# Patient Record
Sex: MALE | Race: WHITE | Employment: FULL TIME | URBAN - METROPOLITAN AREA
[De-identification: names, ages, dates, MRNs, and addresses within clinical notes are randomized per-mention and may not be internally consistent; named-entity substitution may affect disease eponyms.]

---

## 2018-09-10 ENCOUNTER — PAT TELEPHONE (OUTPATIENT)
Dept: PREADMISSION TESTING | Age: 53
End: 2018-09-10

## 2018-09-10 VITALS — BODY MASS INDEX: 26.11 KG/M2 | HEIGHT: 75 IN | WEIGHT: 210 LBS

## 2018-09-10 RX ORDER — OMEPRAZOLE 20 MG/1
20 CAPSULE, DELAYED RELEASE ORAL DAILY
COMMUNITY
End: 2021-12-15 | Stop reason: SDUPTHER

## 2018-09-10 RX ORDER — CETIRIZINE HYDROCHLORIDE 10 MG/1
10 TABLET ORAL DAILY
COMMUNITY

## 2018-09-10 NOTE — PROGRESS NOTES
them.     If you have a living will and a durable power of  for healthcare, please bring in a copy. As part of our patient safety program to minimize surgical site infections, we ask you to do the following:    · Please notify your surgeon if you develop any illness between         now and the  day of your surgery. · This includes a cough, cold, fever, sore throat, nausea,         or vomiting, and diarrhea, etc.  ·  Please notify your surgeon if you experience dizziness, shortness         of breath or blurred vision between now and the time of your surgery. You may shower the night before surgery or the morning of   your surgery with an antibacterial soap. You will need to bring a photo ID and insurance card    [facility] has an onsite pharmacy, would you like to utilize our pharmacy     If you will be staying overnight and use a C-pap machine, please bring   your C-pap to hospital     Our goal is to provide you with excellent care, therefore, visitors will be limited to two(2) in the room at a time so that we may focus on providing this care for you. Please contact pre-admission testing if you have any further questions. [facility] phone number:  [phone]  Please note these are generalized instructions for all surgical cases, you may be provided with more specific instructions according to your surgery.

## 2018-09-17 ENCOUNTER — HOSPITAL ENCOUNTER (OUTPATIENT)
Dept: ENDOSCOPY | Age: 53
Discharge: HOME | End: 2018-09-17
Attending: INTERNAL MEDICINE | Admitting: INTERNAL MEDICINE

## 2018-09-17 VITALS
BODY MASS INDEX: 25.74 KG/M2 | HEART RATE: 44 BPM | DIASTOLIC BLOOD PRESSURE: 77 MMHG | RESPIRATION RATE: 16 BRPM | OXYGEN SATURATION: 100 % | SYSTOLIC BLOOD PRESSURE: 117 MMHG | TEMPERATURE: 97 F | HEIGHT: 75 IN | WEIGHT: 207 LBS

## 2018-09-17 DIAGNOSIS — Z12.11 ENCOUNTER FOR SCREENING FOR MALIGNANT NEOPLASM OF COLON: ICD-10-CM

## 2018-09-17 RX ORDER — SODIUM CHLORIDE 9 MG/ML
INJECTION, SOLUTION INTRAVENOUS CONTINUOUS
Status: DISCONTINUED | OUTPATIENT
Start: 2018-09-17 | End: 2018-09-18 | Stop reason: HOSPADM

## 2018-09-17 RX ORDER — SODIUM CHLORIDE 0.9 % (FLUSH) 0.9 %
10 SYRINGE (ML) INJECTION EVERY 12 HOURS SCHEDULED
Status: DISCONTINUED | OUTPATIENT
Start: 2018-09-17 | End: 2018-09-18 | Stop reason: HOSPADM

## 2018-09-17 RX ORDER — SODIUM CHLORIDE 0.9 % (FLUSH) 0.9 %
10 SYRINGE (ML) INJECTION PRN
Status: DISCONTINUED | OUTPATIENT
Start: 2018-09-17 | End: 2018-09-18 | Stop reason: HOSPADM

## 2018-09-17 RX ORDER — ONDANSETRON 2 MG/ML
4 INJECTION INTRAMUSCULAR; INTRAVENOUS
Status: ACTIVE | OUTPATIENT
Start: 2018-09-17 | End: 2018-09-17

## 2018-09-17 RX ORDER — ONDANSETRON 2 MG/ML
4 INJECTION INTRAMUSCULAR; INTRAVENOUS
Status: COMPLETED | OUTPATIENT
Start: 2018-09-17 | End: 2018-09-17

## 2018-09-17 RX ORDER — LABETALOL HYDROCHLORIDE 5 MG/ML
5 INJECTION, SOLUTION INTRAVENOUS EVERY 10 MIN PRN
Status: DISCONTINUED | OUTPATIENT
Start: 2018-09-17 | End: 2018-09-18 | Stop reason: HOSPADM

## 2018-09-17 RX ORDER — PROMETHAZINE HYDROCHLORIDE 25 MG/ML
6.25 INJECTION, SOLUTION INTRAMUSCULAR; INTRAVENOUS
Status: ACTIVE | OUTPATIENT
Start: 2018-09-17 | End: 2018-09-17

## 2018-09-17 RX ADMIN — ONDANSETRON 4 MG: 2 INJECTION INTRAMUSCULAR; INTRAVENOUS at 11:08

## 2018-09-17 RX ADMIN — SODIUM CHLORIDE: 9 INJECTION, SOLUTION INTRAVENOUS at 10:52

## 2018-09-17 ASSESSMENT — PAIN SCALES - GENERAL
PAINLEVEL_OUTOF10: 0

## 2018-09-17 ASSESSMENT — PAIN - FUNCTIONAL ASSESSMENT: PAIN_FUNCTIONAL_ASSESSMENT: 0-10

## 2018-09-17 NOTE — OP NOTE
COLONOSCOPY      Patient: Lorena Griffith  : 1965   Acct#: [de-identified]    Procedure: Colonoscopy. Date: 2018    Endoscopist: Dahlia Calderón MD    Referring Physician:  Dr. Chaz Valadez    Indications: This is a 46y.o. year old male who presents today for screening colonoscopy. There are no genetic risk factors,    Anesthesia: Propofol 340 mg IV. Procedure: An informed consent was obtained from the patient after explanation of indications, benefits, possible risks and complications of the procedure. The patient was placed in the left lateral decubitus position, and the above IV anesthesia was administered. Digital rectal examination revealed no significant hemorrhoids. There were no sentinel tags. The prostate gland was normal.      The Olympus CFQ-190-AL video colonoscope was inserted and advanced without significant difficulty to the cecum. The cecum was identified by characteristic anatomy of the appendix and the ileo-cecal valve. The preparation was excellent. Circumferential examination of the mucosa demonstrated normal mucosa. Retroflexion in the rectum demonstrated no abnormalities. The patient tolerated the procedure well and was taken to the [address] [place] [address] Unit in good condition. Impression: Normal screening colonoscopy. No genetic risk factors. Recommendations: Repeat colonoscopy screening in ten years.     Previous Colonoscopy: No    Electronically signed by Dahlia Calderón MD on 2018 at 11:50 AM.

## 2018-09-17 NOTE — H&P
Patient: Catia Lockwood  : 1965   Acct#: [de-identified]    Procedure: EGD / Colonoscopy    Date: 2018    Endoscopist: Zayra Phipps MD    Pre-Sedation Documentation and Exam:   I have personally completed a history, physical exam & review of systems for this patient (see notes).     Mallampati Airway Assessment:  Mallampati Class II - (soft palate, fauces & uvula are visible)    ASA Classification:  Class 2 - A normal healthy patient with mild systemic disease    Sedation/ Anesthesia Plan:   general    Patient is an appropriate candidate for plan of sedation: yes    Electronically signed by Zayra Phipps MD on 2018 at 11:14 AM

## 2018-09-17 NOTE — ANESTHESIA PRE-OP
03/15/2016    RBC 4.25 03/15/2016    HGB 13.2 03/15/2016    HCT 39.6 03/15/2016    MCV 93.1 03/15/2016    RDW 14.2 03/15/2016     03/15/2016     CMP    Lab Results   Component Value Date     03/15/2016    K 4.1 03/15/2016     03/15/2016    CO2 28 03/15/2016    BUN 18 03/15/2016    CREATININE 1.1 03/15/2016    GFRAA >60 03/15/2016    GFRAA >60 06/05/2013    AGRATIO 1.4 03/15/2016    LABGLOM >60 03/15/2016    GLUCOSE 99 03/15/2016    PROT 7.4 03/15/2016    CALCIUM 9.7 03/15/2016    BILITOT <0.2 03/15/2016    ALKPHOS 37 03/15/2016    AST 32 03/15/2016    ALT 20 03/15/2016     BMP    Lab Results   Component Value Date     03/15/2016    K 4.1 03/15/2016     03/15/2016    CO2 28 03/15/2016    BUN 18 03/15/2016    CREATININE 1.1 03/15/2016    CALCIUM 9.7 03/15/2016    GFRAA >60 03/15/2016    GFRAA >60 06/05/2013    LABGLOM >60 03/15/2016    GLUCOSE 99 03/15/2016     POCGlucose  No results for input(s): GLUCOSE in the last 72 hours.    Coags  No results found for: PROTIME, INR, APTT  HCG (If Applicable) No results found for: PREGTESTUR, PREGSERUM, HCG, HCGQUANT   ABGs No results found for: PHART, PO2ART, ONM0OEQ, SDP5RSA, BEART, E1GIWDYD   Type & Screen (If Applicable)  No results found for: LABABO, LABRH                         BMI: Wt Readings from Last 3 Encounters:       NPO Status:8 hours                          Anesthesia Evaluation  Patient summary reviewed no history of anesthetic complications:   Airway: Mallampati: III  TM distance: >3 FB   Neck ROM: full  Mouth opening: < 3 FB Dental: normal exam         Pulmonary:Negative Pulmonary ROS and normal exam                               Cardiovascular:Negative CV ROS  Exercise tolerance: good (>4 METS),           Rhythm: regular  Rate: normal           Beta Blocker:  Not on Beta Blocker         Neuro/Psych:   Negative Neuro/Psych ROS              GI/Hepatic/Renal:   (+) GERD:, bowel prep,           Endo/Other: Negative Endo/Other ROS Abdominal:           Vascular: negative vascular ROS. Anesthesia Plan      MAC     ASA 2       Induction: intravenous. Anesthetic plan and risks discussed with patient. Plan discussed with CRNA. This pre-anesthesia assessment may be used as a history and physical.    DOS STAFF ADDENDUM:    Pt seen and examined, chart reviewed (including anesthesia, drug and allergy history). No interval changes to history and physical examination. Anesthetic plan, risks, benefits, alternatives, and personnel involved discussed with patient. Patient verbalized an understanding and agrees to proceed.       Nancy Casillas MD  September 17, 2018  10:43 AM

## 2020-06-03 ENCOUNTER — TELEPHONE (OUTPATIENT)
Dept: PRIMARY CARE CLINIC | Age: 55
End: 2020-06-03

## 2020-06-03 ENCOUNTER — OFFICE VISIT (OUTPATIENT)
Dept: PRIMARY CARE CLINIC | Age: 55
End: 2020-06-03
Payer: COMMERCIAL

## 2020-06-03 VITALS — OXYGEN SATURATION: 96 % | TEMPERATURE: 101.6 F | HEART RATE: 74 BPM

## 2020-06-03 PROCEDURE — 99213 OFFICE O/P EST LOW 20 MIN: CPT | Performed by: NURSE PRACTITIONER

## 2020-06-03 RX ORDER — AZITHROMYCIN 250 MG/1
250 TABLET, FILM COATED ORAL SEE ADMIN INSTRUCTIONS
Qty: 6 TABLET | Refills: 0 | Status: SHIPPED | OUTPATIENT
Start: 2020-06-03 | End: 2020-06-08

## 2020-06-05 LAB
SARS-COV-2: DETECTED
SOURCE: ABNORMAL

## 2021-11-24 ENCOUNTER — OFFICE VISIT (OUTPATIENT)
Dept: FAMILY MEDICINE CLINIC | Age: 56
End: 2021-11-24
Payer: COMMERCIAL

## 2021-11-24 VITALS
HEIGHT: 75 IN | BODY MASS INDEX: 26.86 KG/M2 | DIASTOLIC BLOOD PRESSURE: 68 MMHG | SYSTOLIC BLOOD PRESSURE: 122 MMHG | WEIGHT: 216 LBS | HEART RATE: 58 BPM

## 2021-11-24 DIAGNOSIS — R35.0 BENIGN PROSTATIC HYPERPLASIA WITH URINARY FREQUENCY: ICD-10-CM

## 2021-11-24 DIAGNOSIS — M48.061 SPINAL STENOSIS OF LUMBAR REGION, UNSPECIFIED WHETHER NEUROGENIC CLAUDICATION PRESENT: ICD-10-CM

## 2021-11-24 DIAGNOSIS — E78.00 HYPERCHOLESTEREMIA: ICD-10-CM

## 2021-11-24 DIAGNOSIS — D64.9 MILD ANEMIA: ICD-10-CM

## 2021-11-24 DIAGNOSIS — R59.9 SWOLLEN GLAND: ICD-10-CM

## 2021-11-24 DIAGNOSIS — Z00.00 WELL ADULT EXAM: Primary | ICD-10-CM

## 2021-11-24 DIAGNOSIS — K21.9 GASTROESOPHAGEAL REFLUX DISEASE, UNSPECIFIED WHETHER ESOPHAGITIS PRESENT: ICD-10-CM

## 2021-11-24 DIAGNOSIS — L50.8 CHRONIC URTICARIA: ICD-10-CM

## 2021-11-24 DIAGNOSIS — K22.2 ESOPHAGEAL STRICTURE: ICD-10-CM

## 2021-11-24 DIAGNOSIS — N40.1 BENIGN PROSTATIC HYPERPLASIA WITH URINARY FREQUENCY: ICD-10-CM

## 2021-11-24 LAB
A/G RATIO: 1.7 (ref 1.1–2.2)
ALBUMIN SERPL-MCNC: 4.5 G/DL (ref 3.4–5)
ALP BLD-CCNC: 39 U/L (ref 40–129)
ALT SERPL-CCNC: 33 U/L (ref 10–40)
ANION GAP SERPL CALCULATED.3IONS-SCNC: 10 MMOL/L (ref 3–16)
AST SERPL-CCNC: 50 U/L (ref 15–37)
BASOPHILS ABSOLUTE: 0 K/UL (ref 0–0.2)
BASOPHILS RELATIVE PERCENT: 0.6 %
BILIRUB SERPL-MCNC: 0.3 MG/DL (ref 0–1)
BUN BLDV-MCNC: 25 MG/DL (ref 7–20)
CALCIUM SERPL-MCNC: 9.4 MG/DL (ref 8.3–10.6)
CHLORIDE BLD-SCNC: 104 MMOL/L (ref 99–110)
CHOLESTEROL, TOTAL: 223 MG/DL (ref 0–199)
CO2: 26 MMOL/L (ref 21–32)
CREAT SERPL-MCNC: 1 MG/DL (ref 0.9–1.3)
EOSINOPHILS ABSOLUTE: 0.2 K/UL (ref 0–0.6)
EOSINOPHILS RELATIVE PERCENT: 2.5 %
GFR AFRICAN AMERICAN: >60
GFR NON-AFRICAN AMERICAN: >60
GLUCOSE BLD-MCNC: 83 MG/DL (ref 70–99)
HCT VFR BLD CALC: 38.9 % (ref 40.5–52.5)
HDLC SERPL-MCNC: 46 MG/DL (ref 40–60)
HEMOGLOBIN: 12.8 G/DL (ref 13.5–17.5)
LDL CHOLESTEROL CALCULATED: 143 MG/DL
LYMPHOCYTES ABSOLUTE: 1.7 K/UL (ref 1–5.1)
LYMPHOCYTES RELATIVE PERCENT: 25.8 %
MCH RBC QN AUTO: 30.9 PG (ref 26–34)
MCHC RBC AUTO-ENTMCNC: 32.8 G/DL (ref 31–36)
MCV RBC AUTO: 94 FL (ref 80–100)
MONOCYTES ABSOLUTE: 0.7 K/UL (ref 0–1.3)
MONOCYTES RELATIVE PERCENT: 10.4 %
NEUTROPHILS ABSOLUTE: 4 K/UL (ref 1.7–7.7)
NEUTROPHILS RELATIVE PERCENT: 60.7 %
PDW BLD-RTO: 13.5 % (ref 12.4–15.4)
PLATELET # BLD: 188 K/UL (ref 135–450)
PMV BLD AUTO: 10.1 FL (ref 5–10.5)
POTASSIUM SERPL-SCNC: 4.2 MMOL/L (ref 3.5–5.1)
PROSTATE SPECIFIC ANTIGEN: 0.55 NG/ML (ref 0–4)
RBC # BLD: 4.14 M/UL (ref 4.2–5.9)
SODIUM BLD-SCNC: 140 MMOL/L (ref 136–145)
TOTAL PROTEIN: 7.2 G/DL (ref 6.4–8.2)
TRIGL SERPL-MCNC: 172 MG/DL (ref 0–150)
VLDLC SERPL CALC-MCNC: 34 MG/DL
WBC # BLD: 6.6 K/UL (ref 4–11)

## 2021-11-24 PROCEDURE — 99386 PREV VISIT NEW AGE 40-64: CPT | Performed by: FAMILY MEDICINE

## 2021-11-24 PROCEDURE — G8484 FLU IMMUNIZE NO ADMIN: HCPCS | Performed by: FAMILY MEDICINE

## 2021-11-24 PROCEDURE — 36415 COLL VENOUS BLD VENIPUNCTURE: CPT | Performed by: FAMILY MEDICINE

## 2021-11-24 SDOH — ECONOMIC STABILITY: FOOD INSECURITY: WITHIN THE PAST 12 MONTHS, YOU WORRIED THAT YOUR FOOD WOULD RUN OUT BEFORE YOU GOT MONEY TO BUY MORE.: NEVER TRUE

## 2021-11-24 SDOH — ECONOMIC STABILITY: FOOD INSECURITY: WITHIN THE PAST 12 MONTHS, THE FOOD YOU BOUGHT JUST DIDN'T LAST AND YOU DIDN'T HAVE MONEY TO GET MORE.: NEVER TRUE

## 2021-11-24 SDOH — ECONOMIC STABILITY: TRANSPORTATION INSECURITY
IN THE PAST 12 MONTHS, HAS THE LACK OF TRANSPORTATION KEPT YOU FROM MEDICAL APPOINTMENTS OR FROM GETTING MEDICATIONS?: NO

## 2021-11-24 SDOH — ECONOMIC STABILITY: TRANSPORTATION INSECURITY
IN THE PAST 12 MONTHS, HAS LACK OF TRANSPORTATION KEPT YOU FROM MEETINGS, WORK, OR FROM GETTING THINGS NEEDED FOR DAILY LIVING?: NO

## 2021-11-24 ASSESSMENT — PATIENT HEALTH QUESTIONNAIRE - PHQ9
SUM OF ALL RESPONSES TO PHQ QUESTIONS 1-9: 0
1. LITTLE INTEREST OR PLEASURE IN DOING THINGS: 0
2. FEELING DOWN, DEPRESSED OR HOPELESS: 0
SUM OF ALL RESPONSES TO PHQ QUESTIONS 1-9: 0
SUM OF ALL RESPONSES TO PHQ QUESTIONS 1-9: 0
SUM OF ALL RESPONSES TO PHQ9 QUESTIONS 1 & 2: 0

## 2021-11-24 ASSESSMENT — SOCIAL DETERMINANTS OF HEALTH (SDOH): HOW HARD IS IT FOR YOU TO PAY FOR THE VERY BASICS LIKE FOOD, HOUSING, MEDICAL CARE, AND HEATING?: NOT HARD AT ALL

## 2021-11-24 NOTE — PROGRESS NOTES
2021    Juli Mohamud (:  1965) is a 61 y.o. male, here for a preventive medicine evaluation. Chief Complaint   Patient presents with    Annual Exam     ANNUAL ROUTINE PHYSICAL COLONOSCOPY DONE 2 YEARS AGO      Seen last 5 years ago for chronic urticaria  Patient Active Problem List   Diagnosis    Esophageal stricture    GERD (gastroesophageal reflux disease)    Lumbar spinal stenosis    Palpitations     Had covid infection   psa 0.4, hgb 13.2, o/w labs good - lipid only   Reviewed past history  SLOWER URINE STREAM  USUALLY 1-2X/ NIGHT TO URINATE. Not big water drinker but reasonably. Able to sleep fairly well - not great - doing okay overall. More aches and pains in general.  Dad w/ prostate cancer - age 70  Brother w/ stomach cancer. On omeprazole for 20 years - endoscopy/ colonoscopy good. Gets symptoms back - severe indigestion if misses 2-3 doses - attempts to wean down not gone well. Still occ breakthrough sxs  A lot of fruits/ vegetables - good diet. Limited fast food  Exercise couple x's/ week - tennis   Gland on left side of face -swells and itches at times left side of neck  Got hives -got shots - zyrtec for this - stable as long as stays on zyrtec. occ fissure -but bm usually good  BP Readings from Last 3 Encounters:   21 122/68   18 117/77   03/15/16 118/70     Pulse Readings from Last 3 Encounters:   21 58   20 74   18 (!) 44     Wt Readings from Last 3 Encounters:   21 216 lb (98 kg)   18 207 lb (93.9 kg)   09/10/18 210 lb (95.3 kg)   weight stable generally    Review of Systems  Number of ear surgeries - some hearing loss in loud environments tevin on right side. Vision good other than reading glasses. Had back surgery - a lot of stiffness in low back - exercise helps. Not much joint pain - just soreness after a lot of activity  Gets palpitations from time to time - lifelong - ?  Murmur  Older brother w/ heart surgery last year. No soboe  No dizzy/ balance / issues  Rare ha - had flu few weeks ago - seen in urgent care - cxr okay -   Cough still persisting but slowly improving  Prior to Visit Medications    Medication Sig Taking? Authorizing Provider   cetirizine (ZYRTEC) 10 MG tablet Take 10 mg by mouth daily Yes Historical Provider, MD   omeprazole (PRILOSEC) 20 MG delayed release capsule Take 20 mg by mouth daily Yes Historical Provider, MD        Allergies   Allergen Reactions    Penicillin G Hives    Penicillins Hives    Unable To Assess      \"some type of anesthesia meds make me very nauseated. \"       Past Medical History:   Diagnosis Date    Acid reflux disease     Hard of hearing     right ear hearing loss       Past Surgical History:   Procedure Laterality Date    BACK SURGERY      COLONOSCOPY  09/17/2018    CYST REMOVAL      lipomas    HERNIA REPAIR      inguinal    INNER EAR SURGERY      right ear cholesteotoma x2 - last time age 26   [place]  LUMBAR FUSION  7/22/15    L5-S1    SHOULDER SURGERY      left - screw for dislocations    TONSILLECTOMY  age 9    and adenoids    UPPER GASTROINTESTINAL ENDOSCOPY  09/17/2018    VASECTOMY      around age 36       Social History     Socioeconomic History    Marital status:      Spouse name: Not on file    Number of children: Not on file    Years of education: Not on file    Highest education level: Not on file   Occupational History    Not on file   Tobacco Use    Smoking status: Never Smoker    Smokeless tobacco: Never Used   Substance and Sexual Activity    Alcohol use: Yes     Comment: social    Drug use: No    Sexual activity: Not on file   Other Topics Concern    Not on file   Social History Narrative    Not on file     Social Determinants of Health     Financial Resource Strain: Low Risk     Difficulty of Paying Living Expenses: Not hard at all   Food Insecurity: No Food Insecurity    Worried About [address] in the Last Year: Never true    Ran Out of Food in the Last Year: Never true   Transportation Needs: No Transportation Needs    Lack of Transportation (Medical): No    Lack of Transportation (Non-Medical): No   Physical Activity:     Days of Exercise per Week: Not on file    Minutes of Exercise per Session: Not on file   Stress:     Feeling of Stress : Not on file   Social Connections:     Frequency of Communication with Friends and Family: Not on file    Frequency of Social Gatherings with Friends and Family: Not on file    Attends [employer] Services: Not on file    Active Member of [address] [place] [address] [employer] or Organizations: Not on file    Attends Club or Organization Meetings: Not on file    Marital Status: Not on file   Intimate Partner Violence:     Fear of Current or Ex-Partner: Not on file    Emotionally Abused: Not on file    Physically Abused: Not on file    Sexually Abused: Not on file   Housing Stability:     Unable to Pay for Housing in the Last Year: Not on file    Number of [place] in the Last Year: Not on file    Unstable Housing in the Last Year: Not on file        Family History   Problem Relation Age of Onset    High Blood Pressure Mother     Cancer Father         prostate cancer    Cancer Paternal Grandfather         colon    Cancer Paternal Uncle         bladder       ADVANCE DIRECTIVE: N, <no information>    Vitals:    11/24/21 1454   Weight: 216 lb (98 kg)   Height: 6' 3\" (1.905 m)     Estimated body mass index is 27 kg/m² as calculated from the following:    Height as of this encounter: 6' 3\" (1.905 m). Weight as of this encounter: 216 lb (98 kg). Physical Exam  Constitutional:       General: He is not in acute distress. Appearance: He is well-developed. Eyes:      General: No scleral icterus. Conjunctiva/sclera: Conjunctivae normal.   Neck:      Comments: No parotid mass  Cardiovascular:      Rate and Rhythm: Normal rate and regular rhythm. Heart sounds: Normal heart sounds.  No murmur heard. No gallop. Pulmonary:      Effort: Pulmonary effort is normal. No respiratory distress. Breath sounds: Normal breath sounds. No wheezing, rhonchi or rales. Abdominal:      General: Bowel sounds are normal. There is no distension. Palpations: Abdomen is soft. Tenderness: There is no abdominal tenderness. Genitourinary:     Comments: Prostate normal size - no nodules/ assymetry  Musculoskeletal:         General: No swelling. Lymphadenopathy:      Cervical: Cervical adenopathy present. Skin:     Findings: No erythema or rash. Neurological:      Mental Status: He is alert. No flowsheet data found. Lab Results   Component Value Date    CHOL 184 06/05/2013    TRIG 89 06/05/2013    HDL 47 06/05/2013    LDLCALC 118 06/05/2013    GLUCOSE 99 03/15/2016       The ASCVD Risk score (Michelle Rouse, et al., 2013) failed to calculate for the following reasons: The systolic blood pressure is missing    Cannot find a previous HDL lab    Cannot find a previous total cholesterol lab    Immunization History   Administered Date(s) Administered    COVID-19, Moderna, Primary or Immunocompromised, PF, 100mcg/0.5mL 11/11/2021    Tdap (Boostrix, Adacel) 09/15/2021       Health Maintenance   Topic Date Due    HIV screen  Never done    Diabetes screen  Never done    Colon cancer screen colonoscopy  Never done    Shingles Vaccine (1 of 2) Never done    Lipid screen  03/15/2021    Flu vaccine (1) Never done    COVID-19 Vaccine (2 - Moderna 2-dose series) 12/09/2021    DTaP/Tdap/Td vaccine (2 - Td or Tdap) 09/15/2031    Hepatitis C screen  Completed    Hepatitis A vaccine  Aged Out    Hepatitis B vaccine  Aged Out    Hib vaccine  Aged Out    Meningococcal (ACWY) vaccine  Aged Out    Pneumococcal 0-64 years Vaccine  Aged Out          ASSESSMENT/PLAN:   Diagnosis Orders   1. Well adult exam     2. Gastroesophageal reflux disease, unspecified whether esophagitis present     3. Esophageal stricture     4. Spinal stenosis of lumbar region, unspecified whether neurogenic claudication present     5. Chronic urticaria     6. Swollen gland     7. Mild anemia  CBC Auto Differential   8. Benign prostatic hyperplasia with urinary frequency  Comprehensive Metabolic Panel    PSA, Prostatic Specific Antigen   9. Hypercholesteremia  Lipid Panel    Comprehensive Metabolic Panel   check labs soon  bph d/ wpt - [facility] - psa soon  Hold on tx - consider supplements - f/u if worsening sxs  ? Parotid inflammation - monitor for now and f/u if worsens - warm compresses/ secretogogues prn  Diet/ activity d/w pt  Hydrate encouraged  ppi for gerd - reviewed GI note -stable  Zyrtec daily for urticaria - consider allergy eval but stable on daily zyrtec  Had recent covid and tdap  Diet/ activity dw/ pt  Colonoscopy due again 9/2028 -possibly 2023 2/2 fam hx colon ca  Just got covid vaccine - 2nd one in couple weeks. F/u yearly/ prn  An electronic signature was used to authenticate this note.     --Rudy Lynch MD on 11/24/2021 at 2:57 PM

## 2023-01-20 RX ORDER — OMEPRAZOLE 20 MG/1
CAPSULE, DELAYED RELEASE ORAL
Qty: 90 CAPSULE | Refills: 0 | Status: SHIPPED | OUTPATIENT
Start: 2023-01-20

## 2023-03-01 NOTE — PATIENT INSTRUCTIONS
You may receive a survey regarding the care you received during your visit. Your input is valuable to us. We encourage you to complete and return your survey. We hope you will choose us in the future for your healthcare needs. GENERAL OFFICE POLICIES      Telephone Calls: Messages will be answered within 1-2 business days, unless the provider is out of the office. If it is urgent a covering provider will answer. (this does not include Medication refills). MyChart: We recommend all patients sign up for [employer]. Through this portal you can see your lab results, request refills, schedule appointments, pay your bill and send messages to the office. [employer] messages will be answered within 1-2 business days unless the provider is out of the office. For urgent matters, please call the office. Appointments:  All appointments must be scheduled. We ask all patients to schedule their next follow up appointment before they leave the office to make sure you will be able to be seen before you run out of medications. 24 hours notice is required to cancel or reschedule an appointment to avoid being marked as a no show. You may be dismissed from the practice after 3 no shows. LATE for Appointment: If you are 15 or more minutes late for your appointment, you may be asked to reschedule. MA/LAB APPTS: Must be scheduled, cannot accept walk in lab visits. We only draw labs for patients established in our office. We only do injections for medications ordered by our office. Acute Sick Visits:  Nothing other than acute complaint will be addressed at this visit. TRADITIONAL MEDICARE  DOES NOT COVER PHYSICALS  MEDICARE WELLNESS VISITS: These are NOT physicals but the free annual visit offered by Medicare to discuss wellness issues. Medication refills, checkups, etc. will not be addressed during this visit.   Medication Refills: Refills are handled electronically so please contact your pharmacy for medication refills even if current refills have been exhausted. If you are on a controlled medication you will be referred to a specialist (pain specialist, psychiatry, etc). Forms: There is a $35 fee to fill out FMLA/Disability paperwork, payable at time of . Instead of the fee, you can choose to have the paperwork filled out during a separate office visit that is for filling out the paperwork only. Medication Samples: This office does not carry medication samples. If you need assistance in getting your medications, then please let the medical assistant know so they can help you sign up for a drug assistance program that can help get medications at a reduced cost or even free (if you qualify). Workman's Comp Claims: We do not handle workman's comp cases or claims. You will need to go to an urgent care to be seen or to whomever your employer uses.   General - Any abusive/rude behavior toward staff/providers may be cause for dismissal.

## 2023-03-02 ENCOUNTER — OFFICE VISIT (OUTPATIENT)
Dept: FAMILY MEDICINE CLINIC | Age: 58
End: 2023-03-02
Payer: COMMERCIAL

## 2023-03-02 VITALS
HEIGHT: 75 IN | BODY MASS INDEX: 26.86 KG/M2 | DIASTOLIC BLOOD PRESSURE: 70 MMHG | SYSTOLIC BLOOD PRESSURE: 108 MMHG | WEIGHT: 216 LBS | HEART RATE: 64 BPM | OXYGEN SATURATION: 97 %

## 2023-03-02 DIAGNOSIS — Z12.5 PROSTATE CANCER SCREENING: ICD-10-CM

## 2023-03-02 DIAGNOSIS — R74.01 ELEVATED AST (SGOT): ICD-10-CM

## 2023-03-02 DIAGNOSIS — K21.9 GASTROESOPHAGEAL REFLUX DISEASE, UNSPECIFIED WHETHER ESOPHAGITIS PRESENT: ICD-10-CM

## 2023-03-02 DIAGNOSIS — M48.061 SPINAL STENOSIS OF LUMBAR REGION, UNSPECIFIED WHETHER NEUROGENIC CLAUDICATION PRESENT: ICD-10-CM

## 2023-03-02 DIAGNOSIS — L50.8 CHRONIC URTICARIA: ICD-10-CM

## 2023-03-02 DIAGNOSIS — D17.9 LIPOMA, UNSPECIFIED SITE: ICD-10-CM

## 2023-03-02 DIAGNOSIS — D64.9 MILD ANEMIA: ICD-10-CM

## 2023-03-02 DIAGNOSIS — K22.2 ESOPHAGEAL STRICTURE: ICD-10-CM

## 2023-03-02 DIAGNOSIS — E78.00 HYPERCHOLESTEREMIA: ICD-10-CM

## 2023-03-02 DIAGNOSIS — Z00.00 WELL ADULT EXAM: Primary | ICD-10-CM

## 2023-03-02 LAB
A/G RATIO: 1.7 (ref 1.1–2.2)
ALBUMIN SERPL-MCNC: 4.5 G/DL (ref 3.4–5)
ALP BLD-CCNC: 35 U/L (ref 40–129)
ALT SERPL-CCNC: 28 U/L (ref 10–40)
ANION GAP SERPL CALCULATED.3IONS-SCNC: 11 MMOL/L (ref 3–16)
AST SERPL-CCNC: 36 U/L (ref 15–37)
BASOPHILS ABSOLUTE: 0 K/UL (ref 0–0.2)
BASOPHILS RELATIVE PERCENT: 0.7 %
BILIRUB SERPL-MCNC: 0.4 MG/DL (ref 0–1)
BUN BLDV-MCNC: 21 MG/DL (ref 7–20)
CALCIUM SERPL-MCNC: 10.1 MG/DL (ref 8.3–10.6)
CHLORIDE BLD-SCNC: 104 MMOL/L (ref 99–110)
CHOLESTEROL, TOTAL: 226 MG/DL (ref 0–199)
CO2: 28 MMOL/L (ref 21–32)
CREAT SERPL-MCNC: 1.2 MG/DL (ref 0.9–1.3)
EOSINOPHILS ABSOLUTE: 0.2 K/UL (ref 0–0.6)
EOSINOPHILS RELATIVE PERCENT: 5.5 %
FERRITIN: 352.6 NG/ML (ref 30–400)
FOLATE: 16.65 NG/ML (ref 4.78–24.2)
GFR SERPL CREATININE-BSD FRML MDRD: >60 ML/MIN/{1.73_M2}
GLUCOSE BLD-MCNC: 101 MG/DL (ref 70–99)
HCT VFR BLD CALC: 41.7 % (ref 40.5–52.5)
HDLC SERPL-MCNC: 49 MG/DL (ref 40–60)
HEMOGLOBIN: 14 G/DL (ref 13.5–17.5)
LDL CHOLESTEROL CALCULATED: 158 MG/DL
LYMPHOCYTES ABSOLUTE: 1.5 K/UL (ref 1–5.1)
LYMPHOCYTES RELATIVE PERCENT: 32.9 %
MCH RBC QN AUTO: 31.1 PG (ref 26–34)
MCHC RBC AUTO-ENTMCNC: 33.6 G/DL (ref 31–36)
MCV RBC AUTO: 92.5 FL (ref 80–100)
MONOCYTES ABSOLUTE: 0.5 K/UL (ref 0–1.3)
MONOCYTES RELATIVE PERCENT: 11.1 %
NEUTROPHILS ABSOLUTE: 2.3 K/UL (ref 1.7–7.7)
NEUTROPHILS RELATIVE PERCENT: 49.8 %
PDW BLD-RTO: 13.3 % (ref 12.4–15.4)
PLATELET # BLD: 183 K/UL (ref 135–450)
PMV BLD AUTO: 9.9 FL (ref 5–10.5)
POTASSIUM SERPL-SCNC: 5.1 MMOL/L (ref 3.5–5.1)
PROSTATE SPECIFIC ANTIGEN: 0.67 NG/ML (ref 0–4)
RBC # BLD: 4.51 M/UL (ref 4.2–5.9)
SODIUM BLD-SCNC: 143 MMOL/L (ref 136–145)
TOTAL PROTEIN: 7.2 G/DL (ref 6.4–8.2)
TRIGL SERPL-MCNC: 93 MG/DL (ref 0–150)
VITAMIN B-12: 395 PG/ML (ref 211–911)
VLDLC SERPL CALC-MCNC: 19 MG/DL
WBC # BLD: 4.5 K/UL (ref 4–11)

## 2023-03-02 PROCEDURE — G8484 FLU IMMUNIZE NO ADMIN: HCPCS | Performed by: FAMILY MEDICINE

## 2023-03-02 PROCEDURE — 99396 PREV VISIT EST AGE 40-64: CPT | Performed by: FAMILY MEDICINE

## 2023-03-02 ASSESSMENT — PATIENT HEALTH QUESTIONNAIRE - PHQ9
SUM OF ALL RESPONSES TO PHQ QUESTIONS 1-9: 0
SUM OF ALL RESPONSES TO PHQ9 QUESTIONS 1 & 2: 0
2. FEELING DOWN, DEPRESSED OR HOPELESS: 0
SUM OF ALL RESPONSES TO PHQ QUESTIONS 1-9: 0
SUM OF ALL RESPONSES TO PHQ QUESTIONS 1-9: 0
1. LITTLE INTEREST OR PLEASURE IN DOING THINGS: 0
SUM OF ALL RESPONSES TO PHQ QUESTIONS 1-9: 0

## 2023-03-02 NOTE — PROGRESS NOTES
3/2/2023    Donald Murray (:  1965) is a 61 y.o. male, here for a preventive medicine evaluation. Chief Complaint   Patient presents with    Annual Exam     ANNUAL PHYSICAL      Dilated 3-4x/ in past - takes prilosec daily - okay usually  If misses 3 days, symptoms roar back - happened on vacation. Takes zyrtec and if misses that 3 days - gets hives - occ indigestion but not bad at all on prilosec daily  Seen by dr. Paco Toledo, GI who retired  No swallowing issues -   Occ etoh -on weekends maybe 1-2x/ during week. Never smoked  Plays tennis - walks a lot  But busy on job  Diet fairly good - a lot of fruit/ veggies - eats fair amount of red meat/ fish  Sleeping okay  Mood pretty good. Vision stable - hearing reduced on right - s/p surgery - overall stable  Occ palpitations -no chest pressure - no soboe  May be stress related - drinks coffee in am  Heart murmur as a child - has had palpitations like this for many years  No low libido but some mild issues w/ ED at times -no need for tx presently  ? Shingles in fall? BP Readings from Last 3 Encounters:   23 108/70   21 122/68   18 117/77     Pulse Readings from Last 3 Encounters:   23 64   21 58   20 74     Wt Readings from Last 3 Encounters:   23 216 lb (98 kg)   21 216 lb (98 kg)   18 207 lb (93.9 kg)       Patient Active Problem List   Diagnosis    Esophageal stricture    GERD (gastroesophageal reflux disease)    Lumbar spinal stenosis    Palpitations    Chronic urticaria       Review of Systems  Some mild progressive slowing in urine flow -not major change. Nocturia x1. Bm's regular. Hx of spinal fusion - some soreness/ tightness at times - seen by ortho 2 months ago - torn meniscus knee - cortisone helped. Prior to Visit Medications    Medication Sig Taking?  Authorizing Provider   omeprazole (PRILOSEC) 20 MG delayed release capsule TAKE 1 Jai Siemens Yes Celine Harris MD cetirizine (ZYRTEC) 10 MG tablet Take 10 mg by mouth daily Yes Historical Provider, MD        Allergies   Allergen Reactions    Penicillin G Hives    Penicillins Hives    Unable To Assess      \"some type of anesthesia meds make me very nauseated. \"       Past Medical History:   Diagnosis Date    Acid reflux disease     Hard of hearing     right ear hearing loss       Past Surgical History:   Procedure Laterality Date    BACK SURGERY      COLONOSCOPY  09/17/2018    CYST REMOVAL      lipomas    HERNIA REPAIR      inguinal    INNER EAR SURGERY      right ear cholesteotoma x2 - last time age 26    LUMBAR FUSION  7/22/15    L5-S1    SHOULDER SURGERY      left - screw for dislocations    TONSILLECTOMY  age 9    and adenoids    UPPER GASTROINTESTINAL ENDOSCOPY  09/17/2018    VASECTOMY      around age 36       Social History     Socioeconomic History    Marital status:      Spouse name: Not on file    Number of children: Not on file    Years of education: Not on file    Highest education level: Not on file   Occupational History    Not on file   Tobacco Use    Smoking status: Never    Smokeless tobacco: Never   Substance and Sexual Activity    Alcohol use: Yes     Comment: social    Drug use: No    Sexual activity: Not on file   Other Topics Concern    Not on file   Social History Narrative    Not on file     Social Determinants of Health     Financial Resource Strain: Not on file   Food Insecurity: Not on file   Transportation Needs: Not on file   Physical Activity: Not on file   Stress: Not on file   Social Connections: Not on file   Intimate Partner Violence: Not on file   Housing Stability: Not on file        Family History   Problem Relation Age of Onset    High Blood Pressure Mother     Cancer Father         prostate cancer    Cancer Paternal Grandfather         colon    Cancer Paternal Uncle         bladder       ADVANCE DIRECTIVE: N, <no information>    Vitals:    03/02/23 0755   BP: 108/70   Site: Left Upper Arm   Position: Sitting   Cuff Size: Medium Adult   Pulse: 64   SpO2: 97%   Weight: 216 lb (98 kg)   Height: 6' 3\" (1.905 m)     Estimated body mass index is 27 kg/m² as calculated from the following:    Height as of this encounter: 6' 3\" (1.905 m). Weight as of this encounter: 216 lb (98 kg). Physical Exam  Constitutional:       General: He is not in acute distress. Appearance: He is well-developed. HENT:      Ears:      Comments: Mild wax left > right   W/ surgically reconstructed tm on left  Eyes:      General: No scleral icterus. Conjunctiva/sclera: Conjunctivae normal.   Cardiovascular:      Rate and Rhythm: Normal rate and regular rhythm. Heart sounds: Normal heart sounds. No murmur heard. No gallop. Pulmonary:      Effort: Pulmonary effort is normal. No respiratory distress. Breath sounds: Normal breath sounds. No wheezing, rhonchi or rales. Abdominal:      General: Bowel sounds are normal. There is no distension. Palpations: Abdomen is soft. Tenderness: There is no abdominal tenderness. Musculoskeletal:         General: No swelling. Skin:     Findings: No erythema or rash. Neurological:      Mental Status: He is alert. No flowsheet data found.     Lab Results   Component Value Date/Time    CHOL 223 11/24/2021 03:37 PM    CHOL 184 06/05/2013 09:18 AM    TRIG 172 11/24/2021 03:37 PM    TRIG 89 06/05/2013 09:18 AM    HDL 46 11/24/2021 03:37 PM    HDL 47 06/05/2013 09:18 AM    LDLCALC 143 11/24/2021 03:37 PM    LDLCALC 118 06/05/2013 09:18 AM    GLUCOSE 83 11/24/2021 03:37 PM       The 10-year ASCVD risk score (Amita DK, et al., 2019) is: 6.5%    Values used to calculate the score:      Age: 61 years      Sex: Male      Is Non- : No      Diabetic: No      Tobacco smoker: No      Systolic Blood Pressure: 893 mmHg      Is BP treated: No      HDL Cholesterol: 46 mg/dL      Total Cholesterol: 223 mg/dL    Immunization History Administered Date(s) Administered    COVID-19, MODERNA BLUE border, Primary or Immunocompromised, (age 12y+), IM, 100 mcg/0.5mL 11/11/2021, 12/09/2021    Tdap (Boostrix, Adacel) 09/15/2021       Health Maintenance   Topic Date Due    HIV screen  Never done    Shingles vaccine (1 of 2) Never done    COVID-19 Vaccine (3 - Booster for Moderna series) 02/03/2022    Flu vaccine (1) Never done    Depression Screen  11/24/2022    Lipids  11/24/2026    Colorectal Cancer Screen  09/17/2028    DTaP/Tdap/Td vaccine (2 - Td or Tdap) 09/15/2031    Hepatitis C screen  Completed    Hepatitis A vaccine  Aged Out    Hib vaccine  Aged Out    Meningococcal (ACWY) vaccine  Aged Out    Pneumococcal 0-64 years Vaccine  Aged Out       Assessment & Plan    Diagnosis Orders   1. Well adult exam        2. Esophageal stricture        3. Gastroesophageal reflux disease, unspecified whether esophagitis present        4. Chronic urticaria        5.  Spinal stenosis of lumbar region, unspecified whether neurogenic claudication present        ED issues d/w pt  Knee better since cortisone from ortho  Basic back care stable  Labs reviewed 11/21   Fasting labs today  Zyrtec for urticaria  Prilosec for gerd - attempt to reduce dose to qod d/w pt   Refer to gi w/ fam hx of stomach cancer and hx of esophageal stricture  Last egd 2019 okay  Colonoscopy 2018 - good for 10 years  Utd on tdap  Eligible for shingrix/ covid booster    F/u yearly/ prn  --Doug Murguia MD

## 2023-04-24 RX ORDER — OMEPRAZOLE 20 MG/1
CAPSULE, DELAYED RELEASE ORAL
Qty: 90 CAPSULE | Refills: 1 | Status: SHIPPED | OUTPATIENT
Start: 2023-04-24

## 2023-09-14 NOTE — PROGRESS NOTES
[facility] ENDOSCOPY COLONOSCOPY PRE-OPERATIVE INSTRUCTIONS    Procedure date_09/21/2023________Arrival time_0700___________        Surgery time__0800__________       Clear liquids the day before the procedure. Do not eat or drink anything within 5 hours of your procedure. This includes water chewing gum, mints and ice chips. You may brush your teeth and gargle the morning of your surgery, but do not swallow the water    You may be asked to stop blood thinners such as Coumadin, Plavix, Fragmin, Lovenox, etc., or any anti-inflammatories such as:  Aspirin, Ibuprofen, Advil, Naproxen prior to your procedure. We also ask that you stop any OTC medications such as fish oil, vitamin E, glucosamine, garlic, Multivitamins, COQ 10, etc.    You must make arrangements for a responsible adult to arrive with you and stay in our waiting area during your procedure. They will also need to take you home after your procedure. For your safety you will not be allowed to leave alone or drive yourself home. Also for your safety, it is strongly suggested that someone stay with you the first 24 hours after your procedure. For your comfort, please wear simple loose fitting clothing to the center. Please do not bring valuables. If you have a living will and a durable power of  for healthcare, please bring in a copy.      You will need to bring a photo ID and insurance card    Our goal is to provide you with excellent care so if you have any questions, please contact us at the [facility] at [phone]         Please note these are generalized instructions for all colonoscopy cases, you may be provided with more specific instructions if necessary

## 2023-09-19 ENCOUNTER — ANESTHESIA EVENT (OUTPATIENT)
Dept: ENDOSCOPY | Age: 58
End: 2023-09-19
Payer: COMMERCIAL

## 2023-09-20 ENCOUNTER — PATIENT MESSAGE (OUTPATIENT)
Dept: FAMILY MEDICINE CLINIC | Age: 58
End: 2023-09-20

## 2023-09-20 NOTE — TELEPHONE ENCOUNTER
From: Leonid Tanner  To: Dr. Morales Borne: 9/20/2023 2:15 PM EDT  Subject: Patrick Singletary,    I received a letter from my insurance coverage \"[employer]\" that you are no longer part of the [employer] network. I really would prefer to have you as my doctor and was wondering if the status could change or what other options would allow me to continue to have you as my healthcare provider. I have sincerely valued your advice and hope to continue under your care. Please advise. Thanks!     Pedro Luis Doherty

## 2023-09-21 ENCOUNTER — HOSPITAL ENCOUNTER (OUTPATIENT)
Age: 58
Setting detail: OUTPATIENT SURGERY
Discharge: HOME | End: 2023-09-21
Attending: INTERNAL MEDICINE | Admitting: INTERNAL MEDICINE
Payer: COMMERCIAL

## 2023-09-21 ENCOUNTER — ANESTHESIA (OUTPATIENT)
Dept: ENDOSCOPY | Age: 58
End: 2023-09-21
Payer: COMMERCIAL

## 2023-09-21 VITALS
BODY MASS INDEX: 26.73 KG/M2 | HEIGHT: 75 IN | SYSTOLIC BLOOD PRESSURE: 129 MMHG | DIASTOLIC BLOOD PRESSURE: 91 MMHG | TEMPERATURE: 96.8 F | WEIGHT: 215 LBS | OXYGEN SATURATION: 100 % | HEART RATE: 49 BPM | RESPIRATION RATE: 18 BRPM

## 2023-09-21 DIAGNOSIS — K21.9 GASTROESOPHAGEAL REFLUX DISEASE, UNSPECIFIED WHETHER ESOPHAGITIS PRESENT: ICD-10-CM

## 2023-09-21 DIAGNOSIS — Z12.11 SCREEN FOR COLON CANCER: ICD-10-CM

## 2023-09-21 PROCEDURE — 3700000000 HC ANESTHESIA ATTENDED CARE: Performed by: INTERNAL MEDICINE

## 2023-09-21 PROCEDURE — 3700000001 HC ADD 15 MINUTES (ANESTHESIA): Performed by: INTERNAL MEDICINE

## 2023-09-21 PROCEDURE — 3609010600 HC COLONOSCOPY POLYPECTOMY SNARE/COLD BIOPSY: Performed by: INTERNAL MEDICINE

## 2023-09-21 PROCEDURE — 2580000003 HC RX 258: Performed by: NURSE ANESTHETIST, CERTIFIED REGISTERED

## 2023-09-21 PROCEDURE — 6360000002 HC RX W HCPCS: Performed by: NURSE ANESTHETIST, CERTIFIED REGISTERED

## 2023-09-21 PROCEDURE — 7100000010 HC PHASE II RECOVERY - FIRST 15 MIN: Performed by: INTERNAL MEDICINE

## 2023-09-21 PROCEDURE — 88305 TISSUE EXAM BY PATHOLOGIST: CPT

## 2023-09-21 PROCEDURE — 2580000003 HC RX 258: Performed by: ANESTHESIOLOGY

## 2023-09-21 PROCEDURE — 7100000011 HC PHASE II RECOVERY - ADDTL 15 MIN: Performed by: INTERNAL MEDICINE

## 2023-09-21 PROCEDURE — 3609012400 HC EGD TRANSORAL BIOPSY SINGLE/MULTIPLE: Performed by: INTERNAL MEDICINE

## 2023-09-21 PROCEDURE — 2500000003 HC RX 250 WO HCPCS: Performed by: NURSE ANESTHETIST, CERTIFIED REGISTERED

## 2023-09-21 PROCEDURE — 2709999900 HC NON-CHARGEABLE SUPPLY: Performed by: INTERNAL MEDICINE

## 2023-09-21 RX ORDER — FENTANYL CITRATE 50 UG/ML
25 INJECTION, SOLUTION INTRAMUSCULAR; INTRAVENOUS EVERY 5 MIN PRN
Status: DISCONTINUED | OUTPATIENT
Start: 2023-09-21 | End: 2023-09-21 | Stop reason: HOSPADM

## 2023-09-21 RX ORDER — SODIUM CHLORIDE 0.9 % (FLUSH) 0.9 %
5-40 SYRINGE (ML) INJECTION PRN
Status: DISCONTINUED | OUTPATIENT
Start: 2023-09-21 | End: 2023-09-21 | Stop reason: HOSPADM

## 2023-09-21 RX ORDER — SODIUM CHLORIDE 9 MG/ML
INJECTION, SOLUTION INTRAVENOUS PRN
Status: DISCONTINUED | OUTPATIENT
Start: 2023-09-21 | End: 2023-09-21 | Stop reason: HOSPADM

## 2023-09-21 RX ORDER — SODIUM CHLORIDE 9 MG/ML
INJECTION, SOLUTION INTRAVENOUS CONTINUOUS PRN
Status: DISCONTINUED | OUTPATIENT
Start: 2023-09-21 | End: 2023-09-21 | Stop reason: SDUPTHER

## 2023-09-21 RX ORDER — MEPERIDINE HYDROCHLORIDE 25 MG/ML
12.5 INJECTION INTRAMUSCULAR; INTRAVENOUS; SUBCUTANEOUS EVERY 5 MIN PRN
Status: DISCONTINUED | OUTPATIENT
Start: 2023-09-21 | End: 2023-09-21 | Stop reason: HOSPADM

## 2023-09-21 RX ORDER — SODIUM CHLORIDE 0.9 % (FLUSH) 0.9 %
5-40 SYRINGE (ML) INJECTION EVERY 12 HOURS SCHEDULED
Status: DISCONTINUED | OUTPATIENT
Start: 2023-09-21 | End: 2023-09-21 | Stop reason: HOSPADM

## 2023-09-21 RX ORDER — DIPHENHYDRAMINE HYDROCHLORIDE 50 MG/ML
12.5 INJECTION INTRAMUSCULAR; INTRAVENOUS
Status: DISCONTINUED | OUTPATIENT
Start: 2023-09-21 | End: 2023-09-21 | Stop reason: HOSPADM

## 2023-09-21 RX ORDER — LABETALOL HYDROCHLORIDE 5 MG/ML
5 INJECTION, SOLUTION INTRAVENOUS
Status: DISCONTINUED | OUTPATIENT
Start: 2023-09-21 | End: 2023-09-21 | Stop reason: HOSPADM

## 2023-09-21 RX ORDER — ONDANSETRON 2 MG/ML
4 INJECTION INTRAMUSCULAR; INTRAVENOUS
Status: DISCONTINUED | OUTPATIENT
Start: 2023-09-21 | End: 2023-09-21 | Stop reason: HOSPADM

## 2023-09-21 RX ORDER — PROPOFOL 10 MG/ML
INJECTION, EMULSION INTRAVENOUS PRN
Status: DISCONTINUED | OUTPATIENT
Start: 2023-09-21 | End: 2023-09-21 | Stop reason: SDUPTHER

## 2023-09-21 RX ORDER — ONDANSETRON 2 MG/ML
INJECTION INTRAMUSCULAR; INTRAVENOUS PRN
Status: DISCONTINUED | OUTPATIENT
Start: 2023-09-21 | End: 2023-09-21 | Stop reason: SDUPTHER

## 2023-09-21 RX ORDER — LIDOCAINE HYDROCHLORIDE 20 MG/ML
INJECTION, SOLUTION EPIDURAL; INFILTRATION; INTRACAUDAL; PERINEURAL PRN
Status: DISCONTINUED | OUTPATIENT
Start: 2023-09-21 | End: 2023-09-21 | Stop reason: SDUPTHER

## 2023-09-21 RX ADMIN — LIDOCAINE HYDROCHLORIDE 100 MG: 20 INJECTION, SOLUTION EPIDURAL; INFILTRATION; INTRACAUDAL; PERINEURAL at 08:02

## 2023-09-21 RX ADMIN — PROPOFOL 250 MCG/KG/MIN: 10 INJECTION, EMULSION INTRAVENOUS at 08:03

## 2023-09-21 RX ADMIN — PROPOFOL 100 MG: 10 INJECTION, EMULSION INTRAVENOUS at 08:02

## 2023-09-21 RX ADMIN — SODIUM CHLORIDE: 9 INJECTION, SOLUTION INTRAVENOUS at 07:40

## 2023-09-21 RX ADMIN — SODIUM CHLORIDE: 9 INJECTION, SOLUTION INTRAVENOUS at 07:56

## 2023-09-21 RX ADMIN — ONDANSETRON 4 MG: 2 INJECTION INTRAMUSCULAR; INTRAVENOUS at 08:02

## 2023-09-21 ASSESSMENT — PAIN - FUNCTIONAL ASSESSMENT: PAIN_FUNCTIONAL_ASSESSMENT: 0-10

## 2023-09-21 ASSESSMENT — LIFESTYLE VARIABLES: SMOKING_STATUS: 0

## 2023-09-21 ASSESSMENT — ENCOUNTER SYMPTOMS: SHORTNESS OF BREATH: 0

## 2023-09-21 ASSESSMENT — PAIN SCALES - WONG BAKER: WONGBAKER_NUMERICALRESPONSE: 0

## 2023-09-21 NOTE — OP NOTE
Endoscopy Note    Patient: Taurus Wells  : 1965  Acct#:     Procedure: Esophagogastroduodenoscopy with biopsy                       Colonoscopy with polypectomy (cold snare)    Date:  2023     Surgeon:   Fredis Sears MD    Referring Physician:  Vidya Giron MD    Previous Colonoscopy: YES  Date:  5 years   Greater than 3 years: YES    Indications: This is a 60y.o. year old male who presents today with GERD/Surveillance/FH of CRC    Postoperative Diagnosis:  1. Irregular Z-line-Biopsied 2. Colon Polyps     Anesthesia:  The patient was administered IV propofol per anesthesiology team.  Please see their operative records for full details. Consent:  The patient or their legal guardian has signed a consent, and is aware of the potential risks, benefits, alternatives, and potential complications of this procedure. These include, but are not limited to hemorrhage, bleeding, post procedural pain, perforation, phlebitis, aspiration, hypotension, hypoxia, cardiovascular events such as arryhthmia, and possibly death. Additionally, the possibility of missed colonic polyps and interval colon cancer was discussed in the consent. Description of Procedure: The patient was then taken to the endoscopy suite, placed in the left lateral decubitus position and the above IV sedation was administrered. The Olympus video endoscope was placed through the patient's oropharynx without difficulty to the extent of the 2nd portion of the duodenum. Both forward and retroflexed views of the stomach were obtained. Findings:    Esophagus: The esophagus had an irregular Z-line. Biopsied to evaluate for any short segment Whitlock's esophagus. The esophagus otherwise appeared normal without evidence of reflux esophagitis. Stomach:  The stomach appeared normal on forward and retroflexed views    Duodenum: The first and 2nd portions of the duodenum appeared normal with normal villous pattern    The scope was then withdrawn back into the stomach, it was decompressed, and the scope was completely withdrawn. A digital rectal examination was performed and revealed negative without mass, lesions or tenderness. The Olympus video colonoscope was placed in the patient's rectum under digital direction and advanced to the cecum. The cecum was identified by characteristic anatomy and ballottment. The prep was excellent. The ileocecal valve was identified. The terminal ileum was normal.     The scope was then withdrawn back through the cecum, ascending, transverse, descending, sigmoid colon, and rectum. Careful circumferential examination of the mucosa in these areas demonstrated:    1. A 3 mm polyp in the ascending colon removed completely with cold snare polypectomy  2. A 3 mm polyp in the transverse colon removed completely with cold snare polypectomy    3. A 4 mm polyp in the left colon removed completely with cold snare polypectomy  4. A 5 mm polyp in the left colon removed completely with cold snare polypectomy    5. A 5 mm polyp in the rectum removed completely with cold snare polypectomy     The scope was then withdrawn into the rectum and retroflexed. The retroflexed view of the anal verge and rectum demonstrates normal rectum. The scope was straightened, the colon was decompressed and the scope was withdrawn from the patient. The patient tolerated the procedure well and was taken to the PACU in good condition. Estimated Blood Loss (mL): <5 CC     Complications: None    Specimens: Esophageal/Colon    Impression:    1) See post procedure diagnoses    Recommendations:  Await pathology results   Recommend continuing on Omeprazole as ordered. If biopsies show evidence of Whitlock's esophagus will recommend a repeat EGD in 3 years . Recommend repeat colonoscopy in 3 years for surveillance purposes. Results will be posted to the [facility] patient portal in 7-10 days.  If you dont have access

## 2023-09-21 NOTE — DISCHARGE INSTRUCTIONS
Discharge Instructions for Colonoscopy     Colonoscopy is a visual exam of the lining of the large intestine, also called the bowel or colon, with a colonoscope. A colonoscope is a flexible tube with a light and a viewing device. It allows the doctor to view the inside of the colon through a tiny video camera. Colonoscopy is performed for many reasons: unexplained anemia , pain, diarrhea , bloody stools, cancer screening, among many other reasons. Complications from a colonoscopy are rare. Some possible serious complications include perforated bowel (which might require surgery) and bleeding (which could require blood transfusion ). Minor complications include bloating, gas, and cramping that can last for 1-2 days after the procedure. Because air is put into your colon during the procedure, it is normal to pass large amounts of air from your rectum. You may not have a bowel movement for 1-3 days after the procedure. What You Will Need:  Someone to drive you home after the procedure     Steps to Take:  [address] when you get home. Because the sedative will make you drowsy, don't drive, operate machinery, or make important decisions the day of the procedure. Feelings of bloating, gas, or cramping may persist for 24 hours. Diet -  Try sips of water first. If tolerated, resume bland food (scrambled eggs, toast, soup) first.  If tolerated, resume regular diet or the diet recommended by your physician. Do not drink alcohol for 24 hours. Physical Activity -  Ask your doctor when you will be able to return to work. Do not drive, operate heavy machinery, or do activities that require coordination or balance for 24 hours. Otherwise, return to your normal routine as soon as you are comfortable to do so, which is usually the next day after the procedure. Medications - When taking medications, it's important to:    Take your medication as directed, not more, not less, not at a different

## 2023-09-21 NOTE — ANESTHESIA POSTPROCEDURE EVALUATION
Department of Anesthesiology  Postprocedure Note    Patient: Lidya Shaw  MRN: [id number]  YOB: 1965  Date of evaluation: 9/21/2023      Procedure Summary     Date: 09/21/23 Room / Location: [address] [place] [address]    Anesthesia Start: 8350 Anesthesia Stop: 0822    Procedures:       COLONOSCOPY POLYPECTOMY SNARE/COLD BIOPSY      EGD BIOPSY Diagnosis:       Screen for colon cancer      Gastroesophageal reflux disease, unspecified whether esophagitis present      (Screen for colon cancer [Z12.11])      (Gastroesophageal reflux disease, unspecified whether esophagitis present [K21.9])    Surgeons: Marlon Smith MD Responsible Provider: Christine Pappas MD    Anesthesia Type: MAC ASA Status: 2          Anesthesia Type: No value filed.     Courtney Phase I: Courtney Score: 10    Courtney Phase II: Courtney Score: 10      Anesthesia Post Evaluation    Patient location during evaluation: PACU  Patient participation: complete - patient participated  Level of consciousness: awake  Airway patency: patent  Nausea & Vomiting: no nausea  Complications: no  Cardiovascular status: hemodynamically stable  Respiratory status: acceptable  Hydration status: euvolemic  Multimodal analgesia pain management approach

## 2024-02-22 ENCOUNTER — OFFICE VISIT (OUTPATIENT)
Dept: FAMILY MEDICINE CLINIC | Age: 59
End: 2024-02-22
Payer: COMMERCIAL

## 2024-02-22 ENCOUNTER — TELEPHONE (OUTPATIENT)
Dept: FAMILY MEDICINE CLINIC | Age: 59
End: 2024-02-22

## 2024-02-22 VITALS
HEART RATE: 83 BPM | OXYGEN SATURATION: 97 % | DIASTOLIC BLOOD PRESSURE: 72 MMHG | SYSTOLIC BLOOD PRESSURE: 116 MMHG | HEIGHT: 75 IN | BODY MASS INDEX: 27.1 KG/M2 | WEIGHT: 218 LBS

## 2024-02-22 DIAGNOSIS — J06.9 VIRAL URI: ICD-10-CM

## 2024-02-22 DIAGNOSIS — J02.0 ACUTE STREPTOCOCCAL PHARYNGITIS: ICD-10-CM

## 2024-02-22 DIAGNOSIS — J10.1 INFLUENZA A: Primary | ICD-10-CM

## 2024-02-22 LAB
INFLUENZA A ANTIGEN, POC: POSITIVE
INFLUENZA B ANTIGEN, POC: NEGATIVE
LOT EXPIRE DATE: NORMAL
LOT KIT NUMBER: NORMAL
S PYO AG THROAT QL: POSITIVE
SARS-COV-2, POC: NORMAL
VALID INTERNAL CONTROL: YES
VENDOR AND KIT NAME POC: NORMAL

## 2024-02-22 PROCEDURE — G8484 FLU IMMUNIZE NO ADMIN: HCPCS | Performed by: NURSE PRACTITIONER

## 2024-02-22 PROCEDURE — 99213 OFFICE O/P EST LOW 20 MIN: CPT | Performed by: NURSE PRACTITIONER

## 2024-02-22 PROCEDURE — G8427 DOCREV CUR MEDS BY ELIG CLIN: HCPCS | Performed by: NURSE PRACTITIONER

## 2024-02-22 PROCEDURE — 1036F TOBACCO NON-USER: CPT | Performed by: NURSE PRACTITIONER

## 2024-02-22 PROCEDURE — 3017F COLORECTAL CA SCREEN DOC REV: CPT | Performed by: NURSE PRACTITIONER

## 2024-02-22 PROCEDURE — 87428 SARSCOV & INF VIR A&B AG IA: CPT | Performed by: NURSE PRACTITIONER

## 2024-02-22 PROCEDURE — 87880 STREP A ASSAY W/OPTIC: CPT | Performed by: NURSE PRACTITIONER

## 2024-02-22 PROCEDURE — G8419 CALC BMI OUT NRM PARAM NOF/U: HCPCS | Performed by: NURSE PRACTITIONER

## 2024-02-22 RX ORDER — BENZONATATE 200 MG/1
200 CAPSULE ORAL 3 TIMES DAILY PRN
Qty: 30 CAPSULE | Refills: 0 | Status: SHIPPED | OUTPATIENT
Start: 2024-02-22 | End: 2024-02-29

## 2024-02-22 RX ORDER — AZITHROMYCIN 250 MG/1
TABLET, FILM COATED ORAL
Qty: 6 TABLET | Refills: 0 | Status: SHIPPED | OUTPATIENT
Start: 2024-02-22 | End: 2024-03-03

## 2024-02-22 RX ORDER — OSELTAMIVIR PHOSPHATE 75 MG/1
75 CAPSULE ORAL 2 TIMES DAILY
Qty: 10 CAPSULE | Refills: 0 | Status: SHIPPED | OUTPATIENT
Start: 2024-02-22 | End: 2024-02-27

## 2024-02-22 SDOH — ECONOMIC STABILITY: INCOME INSECURITY: HOW HARD IS IT FOR YOU TO PAY FOR THE VERY BASICS LIKE FOOD, HOUSING, MEDICAL CARE, AND HEATING?: NOT HARD AT ALL

## 2024-02-22 SDOH — ECONOMIC STABILITY: FOOD INSECURITY: WITHIN THE PAST 12 MONTHS, YOU WORRIED THAT YOUR FOOD WOULD RUN OUT BEFORE YOU GOT MONEY TO BUY MORE.: NEVER TRUE

## 2024-02-22 SDOH — ECONOMIC STABILITY: HOUSING INSECURITY
IN THE LAST 12 MONTHS, WAS THERE A TIME WHEN YOU DID NOT HAVE A STEADY PLACE TO SLEEP OR SLEPT IN A SHELTER (INCLUDING NOW)?: NO

## 2024-02-22 SDOH — ECONOMIC STABILITY: FOOD INSECURITY: WITHIN THE PAST 12 MONTHS, THE FOOD YOU BOUGHT JUST DIDN'T LAST AND YOU DIDN'T HAVE MONEY TO GET MORE.: NEVER TRUE

## 2024-02-22 ASSESSMENT — PATIENT HEALTH QUESTIONNAIRE - PHQ9
2. FEELING DOWN, DEPRESSED OR HOPELESS: 0
SUM OF ALL RESPONSES TO PHQ QUESTIONS 1-9: 0
SUM OF ALL RESPONSES TO PHQ9 QUESTIONS 1 & 2: 0
SUM OF ALL RESPONSES TO PHQ QUESTIONS 1-9: 0
SUM OF ALL RESPONSES TO PHQ QUESTIONS 1-9: 0
1. LITTLE INTEREST OR PLEASURE IN DOING THINGS: 0
SUM OF ALL RESPONSES TO PHQ QUESTIONS 1-9: 0

## 2024-02-22 ASSESSMENT — ENCOUNTER SYMPTOMS
COUGH: 1
WHEEZING: 0
SORE THROAT: 1
SHORTNESS OF BREATH: 1
SINUS PAIN: 1
SINUS PRESSURE: 1
RHINORRHEA: 1

## 2024-02-22 NOTE — TELEPHONE ENCOUNTER
Patients wife was calling to get her  seen today for being sick. They said they don't really use MyChart!!!     PT C/O NASAL DRAINAGE: yes   WHAT COLOR: clear    COUGH: yes   BRINGING UP PHLEGM:  yes   IF YES, WHAT COLOR:  yellow green   SORE THROAT:  yes  PND:  yes  HEADACHE:  yes  EAR PAIN:  no  LEFT, RIGHT OR BOTH:    S.O.B.:  no  WITH OR WITHOUT EXERTION:    WHEEZING:  no  HEAD CONGESTION:  no  CHEST CONGESTION:  yes  BODY ACHES:  yes  VOMITTING no but nausea   DIARRHEA:  yes  TEMP:  yes   IF SO, HIGHEST TEMP:  102  SYMPTOMS FOR HOW MANY DAYS:  few days     [facility]   Phone Number       Can we please give them a call

## 2024-02-22 NOTE — PATIENT INSTRUCTIONS
at time of . Instead of the fee, you can choose to have the paperwork filled out during a separate office visit that is for filling out the paperwork only.  Medication Samples:  This office does not carry medication samples.  If you need assistance in getting your medications, then please let the medical assistant know so they can help you sign up for a drug assistance program that can help get medications at a reduced cost or even free (if you qualify).  Workman's Comp Claims: We do not handle workman's comp cases or claims. You will need to go to an urgent care to be seen or to whomever your employer uses.  General - Any abusive/rude behavior toward staff/providers may be cause for dismissal.      WE NOW OFFER [facility] SELF-SCHEDULING   IN 3 EASY STEPS    SCHEDULE AN APPT AT YOUR CONVENIENCE WITH NO HOLD/WAIT TIME  IN THE [facility] JIM SELECT 'SCHEDULE AN APPOINTMENT' FROM THE MENU  CHOOSE DATE/TIME THAT WORKS FOR YOU    If you don't find an appointment time that works for your schedule, you can also submit an appointment request thru [employer].    CONVENIENT QUALITY CARE AT YOUR FINGERTIPS    NOT ON House PartyHART?  PLEASE ASK ANY STAFF MEMBER You may receive a survey regarding the care you received during your visit.  Your input is valuable to us.  We encourage you to complete and return your survey.  We hope you will choose us in the future for your healthcare needs.

## 2024-02-22 NOTE — PROGRESS NOTES
medication, and other remedies.    Review of Systems   Constitutional:  Positive for chills, fatigue and fever.   HENT:  Positive for congestion, postnasal drip, rhinorrhea, sinus pressure, sinus pain and sore throat.    Respiratory:  Positive for cough and shortness of breath. Negative for wheezing.    Cardiovascular:  Negative for chest pain, palpitations and leg swelling.   Neurological:  Positive for headaches. Negative for dizziness, weakness, light-headedness and numbness.   Psychiatric/Behavioral:  Negative for decreased concentration, dysphoric mood, self-injury, sleep disturbance and suicidal ideas. The patient is not nervous/anxious.        Physical Exam  Constitutional:       General: He is not in acute distress.     Appearance: Normal appearance. He is normal weight. He is ill-appearing.   HENT:      Right Ear: Tympanic membrane, ear canal and external ear normal. There is no impacted cerumen.      Left Ear: Tympanic membrane, ear canal and external ear normal. There is no impacted cerumen.      Nose: Congestion and rhinorrhea present.      Mouth/Throat:      Mouth: Mucous membranes are moist.      Pharynx: Posterior oropharyngeal erythema present. No oropharyngeal exudate.   Eyes:      General: No scleral icterus.        Right eye: No discharge.         Left eye: No discharge.      Extraocular Movements: Extraocular movements intact.      Conjunctiva/sclera: Conjunctivae normal.      Pupils: Pupils are equal, round, and reactive to light.   Cardiovascular:      Rate and Rhythm: Normal rate and regular rhythm.      Pulses: Normal pulses.      Heart sounds: Normal heart sounds. No murmur heard.     No gallop.   Pulmonary:      Effort: Pulmonary effort is normal.      Breath sounds: Normal breath sounds. No wheezing.      Comments: Persistent dry barky cough throughout visit  Neurological:      Mental Status: He is alert and oriented to person, place, and time.   Psychiatric:         Mood and Affect: Mood

## 2024-03-27 RX ORDER — OMEPRAZOLE 20 MG/1
CAPSULE, DELAYED RELEASE ORAL DAILY
Qty: 90 CAPSULE | Refills: 0 | Status: SHIPPED | OUTPATIENT
Start: 2024-03-27

## 2024-06-27 ENCOUNTER — OFFICE VISIT (OUTPATIENT)
Dept: FAMILY MEDICINE CLINIC | Age: 59
End: 2024-06-27
Payer: COMMERCIAL

## 2024-06-27 VITALS
SYSTOLIC BLOOD PRESSURE: 124 MMHG | OXYGEN SATURATION: 97 % | BODY MASS INDEX: 26.61 KG/M2 | HEART RATE: 46 BPM | DIASTOLIC BLOOD PRESSURE: 86 MMHG | WEIGHT: 214 LBS | HEIGHT: 75 IN

## 2024-06-27 DIAGNOSIS — Z00.00 WELL ADULT EXAM: Primary | ICD-10-CM

## 2024-06-27 DIAGNOSIS — K21.9 GASTROESOPHAGEAL REFLUX DISEASE, UNSPECIFIED WHETHER ESOPHAGITIS PRESENT: ICD-10-CM

## 2024-06-27 DIAGNOSIS — D17.9 LIPOMA, UNSPECIFIED SITE: ICD-10-CM

## 2024-06-27 DIAGNOSIS — E78.00 HYPERCHOLESTEREMIA: ICD-10-CM

## 2024-06-27 DIAGNOSIS — K22.2 ESOPHAGEAL STRICTURE: ICD-10-CM

## 2024-06-27 DIAGNOSIS — M48.061 SPINAL STENOSIS OF LUMBAR REGION, UNSPECIFIED WHETHER NEUROGENIC CLAUDICATION PRESENT: ICD-10-CM

## 2024-06-27 DIAGNOSIS — L50.8 CHRONIC URTICARIA: ICD-10-CM

## 2024-06-27 DIAGNOSIS — Z86.2 HISTORY OF ANEMIA: ICD-10-CM

## 2024-06-27 DIAGNOSIS — K63.5 POLYP OF COLON, UNSPECIFIED PART OF COLON, UNSPECIFIED TYPE: ICD-10-CM

## 2024-06-27 DIAGNOSIS — Z12.5 PROSTATE CANCER SCREENING: ICD-10-CM

## 2024-06-27 PROCEDURE — 99396 PREV VISIT EST AGE 40-64: CPT | Performed by: FAMILY MEDICINE

## 2024-06-27 PROCEDURE — 99214 OFFICE O/P EST MOD 30 MIN: CPT | Performed by: FAMILY MEDICINE

## 2024-06-27 NOTE — PATIENT INSTRUCTIONS

## 2024-06-27 NOTE — PROGRESS NOTES
2024    Ritesh Hagen (:  1965) is a 58 y.o. male, here for a preventive medicine evaluation.  Chief Complaint   Patient presents with    Annual Exam     ANNUAL PHYSICAL      No fam hx of cad other than mgf w/ CM  Colonoscopy  due - for routine f/u  Palpitations periodically  No cp/ sob  On zyrtec to prevent hives or issues w/ esophagus - along w/ ppi  Had several dilations on stricture - unable to wean dose  Back doing well overwell  Seen by ortho for achilles tendonitis  Good balanced diet  No supplements  Dad w prostate ca  Urinary sxs stable  Sleep okay - not great  Walking still - golfs - cut back on tennis  tendonitis  Had some rosacea - cleared but skin lesions - plans to go back to practice in [email or web address]    BP Readings from Last 3 Encounters:   24 124/86   24 116/72   23 (!) 129/91     Pulse Readings from Last 3 Encounters:   24 (!) 46   24 83   23 (!) 49     Wt Readings from Last 3 Encounters:   24 97.1 kg (214 lb)   24 98.9 kg (218 lb)   23 97.5 kg (215 lb)   The 10-year ASCVD risk score (Amita SIDDIQUI, et al., 2019) is: 8.6%    Values used to calculate the score:      Age: 58 years      Sex: Male      Is Non- : No      Diabetic: No      Tobacco smoker: No      Systolic Blood Pressure: 124 mmHg      Is BP treated: No      HDL Cholesterol: 49 mg/dL      Total Cholesterol: 226 mg/dL      Subjective   Patient Active Problem List   Diagnosis    Esophageal stricture    GERD (gastroesophageal reflux disease)    Lumbar spinal stenosis    Palpitations    Chronic urticaria       Review of Systems  Ear wax on right - but hearing okay  Vision good  Never able to get lipomas  Prior to Visit Medications    Medication Sig Taking? Authorizing Provider   omeprazole (PRILOSEC) 20 MG delayed release capsule TAKE 1 CAPSULE BY MOUTH EVERY DAY  Glischinski, Luke A, APRN - CNP   cetirizine (ZYRTEC) 10 MG tablet Take 1 tablet by mouth

## 2024-07-03 RX ORDER — OMEPRAZOLE 20 MG/1
CAPSULE, DELAYED RELEASE ORAL DAILY
Qty: 90 CAPSULE | Refills: 3 | Status: SHIPPED | OUTPATIENT
Start: 2024-07-03

## 2024-07-08 ENCOUNTER — NURSE ONLY (OUTPATIENT)
Dept: FAMILY MEDICINE CLINIC | Age: 59
End: 2024-07-08
Payer: COMMERCIAL

## 2024-07-08 DIAGNOSIS — Z12.5 PROSTATE CANCER SCREENING: ICD-10-CM

## 2024-07-08 DIAGNOSIS — Z86.2 HISTORY OF ANEMIA: ICD-10-CM

## 2024-07-08 DIAGNOSIS — E78.00 HYPERCHOLESTEREMIA: ICD-10-CM

## 2024-07-08 LAB
ALBUMIN SERPL-MCNC: 4.3 G/DL (ref 3.4–5)
ALBUMIN/GLOB SERPL: 1.8 {RATIO} (ref 1.1–2.2)
ALP SERPL-CCNC: 36 U/L (ref 40–129)
ALT SERPL-CCNC: 28 U/L (ref 10–40)
ANION GAP SERPL CALCULATED.3IONS-SCNC: 12 MMOL/L (ref 3–16)
AST SERPL-CCNC: 30 U/L (ref 15–37)
BASOPHILS # BLD: 0 K/UL (ref 0–0.2)
BASOPHILS NFR BLD: 0.9 %
BILIRUB SERPL-MCNC: 0.4 MG/DL (ref 0–1)
BUN SERPL-MCNC: 20 MG/DL (ref 7–20)
CALCIUM SERPL-MCNC: 9.4 MG/DL (ref 8.3–10.6)
CHLORIDE SERPL-SCNC: 105 MMOL/L (ref 99–110)
CHOLEST SERPL-MCNC: 208 MG/DL (ref 0–199)
CO2 SERPL-SCNC: 24 MMOL/L (ref 21–32)
CREAT SERPL-MCNC: 1 MG/DL (ref 0.9–1.3)
DEPRECATED RDW RBC AUTO: 13.7 % (ref 12.4–15.4)
EOSINOPHIL # BLD: 0.3 K/UL (ref 0–0.6)
EOSINOPHIL NFR BLD: 6 %
GFR SERPLBLD CREATININE-BSD FMLA CKD-EPI: 86 ML/MIN/{1.73_M2}
GLUCOSE SERPL-MCNC: 100 MG/DL (ref 70–99)
HCT VFR BLD AUTO: 38.3 % (ref 40.5–52.5)
HDLC SERPL-MCNC: 45 MG/DL (ref 40–60)
HGB BLD-MCNC: 13 G/DL (ref 13.5–17.5)
LDLC SERPL CALC-MCNC: 129 MG/DL
LYMPHOCYTES # BLD: 1.7 K/UL (ref 1–5.1)
LYMPHOCYTES NFR BLD: 32.7 %
MCH RBC QN AUTO: 31.5 PG (ref 26–34)
MCHC RBC AUTO-ENTMCNC: 33.9 G/DL (ref 31–36)
MCV RBC AUTO: 93.1 FL (ref 80–100)
MONOCYTES # BLD: 0.5 K/UL (ref 0–1.3)
MONOCYTES NFR BLD: 9.2 %
NEUTROPHILS # BLD: 2.6 K/UL (ref 1.7–7.7)
NEUTROPHILS NFR BLD: 51.2 %
PLATELET # BLD AUTO: 178 K/UL (ref 135–450)
PMV BLD AUTO: 10.1 FL (ref 5–10.5)
POTASSIUM SERPL-SCNC: 4.2 MMOL/L (ref 3.5–5.1)
PROT SERPL-MCNC: 6.7 G/DL (ref 6.4–8.2)
PSA SERPL DL<=0.01 NG/ML-MCNC: 0.51 NG/ML (ref 0–4)
RBC # BLD AUTO: 4.11 M/UL (ref 4.2–5.9)
SODIUM SERPL-SCNC: 141 MMOL/L (ref 136–145)
TRIGL SERPL-MCNC: 171 MG/DL (ref 0–150)
VIT B12 SERPL-MCNC: 288 PG/ML (ref 211–911)
VLDLC SERPL CALC-MCNC: 34 MG/DL
WBC # BLD AUTO: 5.1 K/UL (ref 4–11)

## 2024-07-08 PROCEDURE — 36415 COLL VENOUS BLD VENIPUNCTURE: CPT | Performed by: FAMILY MEDICINE

## 2024-10-07 ENCOUNTER — PREP FOR PROCEDURE (OUTPATIENT)
Dept: SURGERY | Age: 59
End: 2024-10-07

## 2024-10-07 ENCOUNTER — OFFICE VISIT (OUTPATIENT)
Dept: SURGERY | Age: 59
End: 2024-10-07

## 2024-10-07 VITALS
HEART RATE: 52 BPM | WEIGHT: 214 LBS | HEIGHT: 75 IN | BODY MASS INDEX: 26.61 KG/M2 | SYSTOLIC BLOOD PRESSURE: 120 MMHG | DIASTOLIC BLOOD PRESSURE: 79 MMHG

## 2024-10-07 DIAGNOSIS — D17.1 LIPOMA OF TORSO: Primary | ICD-10-CM

## 2024-10-07 PROBLEM — D17.9 LIPOMA: Status: ACTIVE | Noted: 2024-10-07

## 2024-10-07 PROCEDURE — NBSRV NON-BILLABLE SERVICE: Performed by: SURGERY

## 2024-10-07 RX ORDER — SODIUM CHLORIDE 9 MG/ML
INJECTION, SOLUTION INTRAVENOUS PRN
Status: CANCELLED | OUTPATIENT
Start: 2024-10-07

## 2024-10-07 RX ORDER — SODIUM CHLORIDE 0.9 % (FLUSH) 0.9 %
5-40 SYRINGE (ML) INJECTION PRN
Status: CANCELLED | OUTPATIENT
Start: 2024-10-07

## 2024-10-07 RX ORDER — SODIUM CHLORIDE 0.9 % (FLUSH) 0.9 %
5-40 SYRINGE (ML) INJECTION EVERY 12 HOURS SCHEDULED
Status: CANCELLED | OUTPATIENT
Start: 2024-10-07

## 2024-10-07 RX ORDER — OMEPRAZOLE 40 MG/1
CAPSULE, DELAYED RELEASE ORAL
COMMUNITY
Start: 2024-08-16

## 2024-10-07 NOTE — PROGRESS NOTES
Ritesh Hagen (:  1965) is a 59 y.o. male,New patient, here for evaluation of the following chief complaint(s):  Surgical Consult (Lipomas, BUE, chest and back)         Assessment & Plan  Lipoma of torso     Removal of multiple lipomas from chest, back, flank and arms    The risks, benefits and alternatives to the planned procedure were discussed. Patient expressed an understanding and is willing to proceed.                    Subjective   HPI  Patient with multiple lipomas of torso and arms. Notes increasing size and pain with direct pressure. Plan removal in OR under sedation. The risks, benefits and alternatives to the planned procedure were discussed. Patient expressed an understanding and is willing to proceed.    Review of Systems       Objective   Physical Exam       Electronically signed by RALPH ANDRADE MD on 10/7/2024 at 4:22 PM        An electronic signature was used to authenticate this note.    --RALPH ANDRADE MD

## 2024-10-07 NOTE — PATIENT INSTRUCTIONS
jewelry and valuables at home.   Wear comfortable, loose-fitting clothing on the day of surgery.   Dentures and hearing aids may be worn, but may be removed by OR staff prior to surgery.   If you wear glasses/contacts, please wear glasses on the day of your procedure.     If you develop cough, flu, or COVID-19 symptoms before surgery, please notify the office.   More detailed post-operative instructions will be provided on the day of surgery.         Please contact Dr. Teran assistant, Dot, if you have any questions or need to reschedule your surgery.    Office phone number: [phone]  Fax number for [place]: [phone]

## 2024-10-07 NOTE — PROGRESS NOTES
[facility] PRE-OPERATIVE INSTRUCTIONS    Day of Procedure:    10/9            Arrival time:     10           Surgery time:1120    Take the following medications with a sip of water:  Follow your MD/Surgeons pre-procedure instructions regarding your medications     Do not eat or drink anything after 12:00 midnight prior to your surgery.  This includes water chewing gum, mints and ice chips.   You may brush your teeth and gargle the morning of your surgery, but do not swallow the water     Please see your family doctor/pediatrician for a history and physical and/or concerning medications.   Bring any test results/reports from your physicians office.   If you are under the care of a heart doctor or specialist doctor, please be aware that you may be asked to them for clearance    You may be asked to stop blood thinners such as Coumadin, Plavix, Fragmin, Lovenox, etc., or any anti-inflammatories such as:  Aspirin, Ibuprofen, Advil, Naproxen prior to your surgery.    We also ask that you stop any OTC medications such as fish oil, vitamin E, glucosamine, garlic, Multivitamins, COQ 10, etc.    We ask that you do not smoke 24 hours prior to surgery  We ask that you do not  drink any alcoholic beverages 24 hours prior to surgery     You must make arrangements for a responsible adult to take you home after your surgery.    For your safety you will not be allowed to leave alone or drive yourself home.  Your surgery will be cancelled if you do not have a ride home.     Also for your safety, you must have someone stay with you the first 24 hours after your surgery.     A parent or legal guardian must accompany a child scheduled for surgery and plan to stay at the hospital until the child is discharged.    Please do not bring other children with you.    For your comfort, please wear simple loose fitting clothing to the hospital.  Please do not bring valuables.    Do not wear any make-up or nail polish on your fingers

## 2024-10-07 NOTE — ASSESSMENT & PLAN NOTE
Removal of multiple lipomas from chest, back, flank and arms    The risks, benefits and alternatives to the planned procedure were discussed. Patient expressed an understanding and is willing to proceed.

## 2024-10-08 ENCOUNTER — ANESTHESIA EVENT (OUTPATIENT)
Dept: OPERATING ROOM | Age: 59
End: 2024-10-08
Payer: COMMERCIAL

## 2024-10-09 ENCOUNTER — ANESTHESIA (OUTPATIENT)
Dept: OPERATING ROOM | Age: 59
End: 2024-10-09
Payer: COMMERCIAL

## 2024-10-09 ENCOUNTER — HOSPITAL ENCOUNTER (OUTPATIENT)
Age: 59
Setting detail: OUTPATIENT SURGERY
Discharge: HOME | End: 2024-10-09
Attending: SURGERY | Admitting: SURGERY
Payer: COMMERCIAL

## 2024-10-09 VITALS
WEIGHT: 210.21 LBS | RESPIRATION RATE: 18 BRPM | HEIGHT: 75 IN | DIASTOLIC BLOOD PRESSURE: 68 MMHG | TEMPERATURE: 97 F | OXYGEN SATURATION: 99 % | HEART RATE: 49 BPM | BODY MASS INDEX: 26.14 KG/M2 | SYSTOLIC BLOOD PRESSURE: 143 MMHG

## 2024-10-09 DIAGNOSIS — D17.9 LIPOMA: ICD-10-CM

## 2024-10-09 PROBLEM — D17.20 LIPOMA OF FOREARM: Status: ACTIVE | Noted: 2024-10-09

## 2024-10-09 PROBLEM — D17.23 LIPOMA OF RIGHT THIGH: Status: ACTIVE | Noted: 2024-10-09

## 2024-10-09 PROCEDURE — 21555 EXC NECK LES SC < 3 CM: CPT | Performed by: SURGERY

## 2024-10-09 PROCEDURE — 7100000001 HC PACU RECOVERY - ADDTL 15 MIN: Performed by: SURGERY

## 2024-10-09 PROCEDURE — 3600000013 HC SURGERY LEVEL 3 ADDTL 15MIN: Performed by: SURGERY

## 2024-10-09 PROCEDURE — 6360000002 HC RX W HCPCS: Performed by: SURGERY

## 2024-10-09 PROCEDURE — 6360000002 HC RX W HCPCS: Performed by: NURSE ANESTHETIST, CERTIFIED REGISTERED

## 2024-10-09 PROCEDURE — 2500000003 HC RX 250 WO HCPCS: Performed by: NURSE ANESTHETIST, CERTIFIED REGISTERED

## 2024-10-09 PROCEDURE — 2580000003 HC RX 258: Performed by: ANESTHESIOLOGY

## 2024-10-09 PROCEDURE — 3700000001 HC ADD 15 MINUTES (ANESTHESIA): Performed by: SURGERY

## 2024-10-09 PROCEDURE — 2709999900 HC NON-CHARGEABLE SUPPLY: Performed by: SURGERY

## 2024-10-09 PROCEDURE — 3600000003 HC SURGERY LEVEL 3 BASE: Performed by: SURGERY

## 2024-10-09 PROCEDURE — 2500000003 HC RX 250 WO HCPCS: Performed by: SURGERY

## 2024-10-09 PROCEDURE — 2580000003 HC RX 258: Performed by: SURGERY

## 2024-10-09 PROCEDURE — 88304 TISSUE EXAM BY PATHOLOGIST: CPT

## 2024-10-09 PROCEDURE — A4217 STERILE WATER/SALINE, 500 ML: HCPCS | Performed by: SURGERY

## 2024-10-09 PROCEDURE — 25075 EXC FOREARM LES SC < 3 CM: CPT | Performed by: SURGERY

## 2024-10-09 PROCEDURE — 3700000000 HC ANESTHESIA ATTENDED CARE: Performed by: SURGERY

## 2024-10-09 PROCEDURE — 21930 EXC BACK LES SC < 3 CM: CPT | Performed by: SURGERY

## 2024-10-09 PROCEDURE — 27327 EXC THIGH/KNEE LES SC < 3 CM: CPT | Performed by: SURGERY

## 2024-10-09 PROCEDURE — 7100000010 HC PHASE II RECOVERY - FIRST 15 MIN: Performed by: SURGERY

## 2024-10-09 PROCEDURE — 7100000000 HC PACU RECOVERY - FIRST 15 MIN: Performed by: SURGERY

## 2024-10-09 PROCEDURE — 7100000011 HC PHASE II RECOVERY - ADDTL 15 MIN: Performed by: SURGERY

## 2024-10-09 RX ORDER — SODIUM CHLORIDE 9 MG/ML
INJECTION, SOLUTION INTRAVENOUS PRN
Status: DISCONTINUED | OUTPATIENT
Start: 2024-10-09 | End: 2024-10-09 | Stop reason: HOSPADM

## 2024-10-09 RX ORDER — SODIUM CHLORIDE 0.9 % (FLUSH) 0.9 %
5-40 SYRINGE (ML) INJECTION EVERY 12 HOURS SCHEDULED
Status: DISCONTINUED | OUTPATIENT
Start: 2024-10-09 | End: 2024-10-09 | Stop reason: HOSPADM

## 2024-10-09 RX ORDER — MAGNESIUM HYDROXIDE 1200 MG/15ML
LIQUID ORAL CONTINUOUS PRN
Status: COMPLETED | OUTPATIENT
Start: 2024-10-09 | End: 2024-10-09

## 2024-10-09 RX ORDER — PROPOFOL 10 MG/ML
INJECTION, EMULSION INTRAVENOUS
Status: DISCONTINUED | OUTPATIENT
Start: 2024-10-09 | End: 2024-10-09 | Stop reason: SDUPTHER

## 2024-10-09 RX ORDER — ONDANSETRON 2 MG/ML
4 INJECTION INTRAMUSCULAR; INTRAVENOUS
Status: DISCONTINUED | OUTPATIENT
Start: 2024-10-09 | End: 2024-10-09 | Stop reason: HOSPADM

## 2024-10-09 RX ORDER — SODIUM CHLORIDE 0.9 % (FLUSH) 0.9 %
5-40 SYRINGE (ML) INJECTION PRN
Status: DISCONTINUED | OUTPATIENT
Start: 2024-10-09 | End: 2024-10-09 | Stop reason: HOSPADM

## 2024-10-09 RX ORDER — OXYCODONE HYDROCHLORIDE 5 MG/1
5 TABLET ORAL PRN
Status: DISCONTINUED | OUTPATIENT
Start: 2024-10-09 | End: 2024-10-09 | Stop reason: HOSPADM

## 2024-10-09 RX ORDER — OXYCODONE HYDROCHLORIDE 10 MG/1
10 TABLET ORAL PRN
Status: DISCONTINUED | OUTPATIENT
Start: 2024-10-09 | End: 2024-10-09 | Stop reason: HOSPADM

## 2024-10-09 RX ORDER — METOCLOPRAMIDE HYDROCHLORIDE 5 MG/ML
INJECTION INTRAMUSCULAR; INTRAVENOUS
Status: DISCONTINUED | OUTPATIENT
Start: 2024-10-09 | End: 2024-10-09 | Stop reason: SDUPTHER

## 2024-10-09 RX ORDER — ONDANSETRON 2 MG/ML
INJECTION INTRAMUSCULAR; INTRAVENOUS
Status: DISCONTINUED | OUTPATIENT
Start: 2024-10-09 | End: 2024-10-09 | Stop reason: SDUPTHER

## 2024-10-09 RX ORDER — MEPERIDINE HYDROCHLORIDE 25 MG/ML
12.5 INJECTION INTRAMUSCULAR; INTRAVENOUS; SUBCUTANEOUS EVERY 5 MIN PRN
Status: DISCONTINUED | OUTPATIENT
Start: 2024-10-09 | End: 2024-10-09 | Stop reason: HOSPADM

## 2024-10-09 RX ORDER — MIDAZOLAM HYDROCHLORIDE 1 MG/ML
INJECTION INTRAMUSCULAR; INTRAVENOUS
Status: DISCONTINUED | OUTPATIENT
Start: 2024-10-09 | End: 2024-10-09 | Stop reason: SDUPTHER

## 2024-10-09 RX ORDER — FENTANYL CITRATE 0.05 MG/ML
50 INJECTION, SOLUTION INTRAMUSCULAR; INTRAVENOUS EVERY 5 MIN PRN
Status: DISCONTINUED | OUTPATIENT
Start: 2024-10-09 | End: 2024-10-09 | Stop reason: HOSPADM

## 2024-10-09 RX ORDER — SODIUM CHLORIDE 0.9 % (FLUSH) 0.9 %
5-40 SYRINGE (ML) INJECTION PRN
Status: DISCONTINUED | OUTPATIENT
Start: 2024-10-09 | End: 2024-10-09

## 2024-10-09 RX ORDER — NALOXONE HYDROCHLORIDE 0.4 MG/ML
INJECTION, SOLUTION INTRAMUSCULAR; INTRAVENOUS; SUBCUTANEOUS PRN
Status: DISCONTINUED | OUTPATIENT
Start: 2024-10-09 | End: 2024-10-09 | Stop reason: HOSPADM

## 2024-10-09 RX ORDER — SODIUM CHLORIDE 0.9 % (FLUSH) 0.9 %
5-40 SYRINGE (ML) INJECTION EVERY 12 HOURS SCHEDULED
Status: DISCONTINUED | OUTPATIENT
Start: 2024-10-09 | End: 2024-10-09

## 2024-10-09 RX ORDER — GLYCOPYRROLATE 0.2 MG/ML
INJECTION INTRAMUSCULAR; INTRAVENOUS
Status: DISCONTINUED | OUTPATIENT
Start: 2024-10-09 | End: 2024-10-09 | Stop reason: SDUPTHER

## 2024-10-09 RX ORDER — FAMOTIDINE 10 MG/ML
INJECTION, SOLUTION INTRAVENOUS
Status: DISCONTINUED | OUTPATIENT
Start: 2024-10-09 | End: 2024-10-09 | Stop reason: SDUPTHER

## 2024-10-09 RX ORDER — FENTANYL CITRATE 0.05 MG/ML
25 INJECTION, SOLUTION INTRAMUSCULAR; INTRAVENOUS EVERY 5 MIN PRN
Status: DISCONTINUED | OUTPATIENT
Start: 2024-10-09 | End: 2024-10-09 | Stop reason: HOSPADM

## 2024-10-09 RX ORDER — FENTANYL CITRATE 50 UG/ML
INJECTION, SOLUTION INTRAMUSCULAR; INTRAVENOUS
Status: DISCONTINUED | OUTPATIENT
Start: 2024-10-09 | End: 2024-10-09 | Stop reason: SDUPTHER

## 2024-10-09 RX ORDER — SODIUM CHLORIDE 9 MG/ML
INJECTION, SOLUTION INTRAVENOUS PRN
Status: DISCONTINUED | OUTPATIENT
Start: 2024-10-09 | End: 2024-10-09

## 2024-10-09 RX ORDER — TRAMADOL HYDROCHLORIDE 50 MG/1
50 TABLET ORAL EVERY 6 HOURS PRN
Qty: 12 TABLET | Refills: 0 | Status: SHIPPED | OUTPATIENT
Start: 2024-10-09 | End: 2024-10-12

## 2024-10-09 RX ORDER — DEXAMETHASONE SODIUM PHOSPHATE 4 MG/ML
INJECTION, SOLUTION INTRA-ARTICULAR; INTRALESIONAL; INTRAMUSCULAR; INTRAVENOUS; SOFT TISSUE
Status: DISCONTINUED | OUTPATIENT
Start: 2024-10-09 | End: 2024-10-09 | Stop reason: SDUPTHER

## 2024-10-09 RX ADMIN — SODIUM CHLORIDE 50 ML/HR: 9 INJECTION, SOLUTION INTRAVENOUS at 10:32

## 2024-10-09 RX ADMIN — DEXAMETHASONE SODIUM PHOSPHATE 8 MG: 4 INJECTION, SOLUTION INTRAMUSCULAR; INTRAVENOUS at 11:13

## 2024-10-09 RX ADMIN — MIDAZOLAM 2 MG: 1 INJECTION INTRAMUSCULAR; INTRAVENOUS at 11:00

## 2024-10-09 RX ADMIN — GLYCOPYRROLATE 0.2 MG: 0.2 INJECTION INTRAMUSCULAR; INTRAVENOUS at 11:23

## 2024-10-09 RX ADMIN — FAMOTIDINE 20 MG: 10 INJECTION, SOLUTION INTRAVENOUS at 10:53

## 2024-10-09 RX ADMIN — SODIUM CHLORIDE 2000 MG: 900 INJECTION INTRAVENOUS at 11:03

## 2024-10-09 RX ADMIN — METOCLOPRAMIDE 10 MG: 5 INJECTION, SOLUTION INTRAMUSCULAR; INTRAVENOUS at 10:53

## 2024-10-09 RX ADMIN — ONDANSETRON 4 MG: 2 INJECTION INTRAMUSCULAR; INTRAVENOUS at 11:13

## 2024-10-09 RX ADMIN — FENTANYL CITRATE 50 MCG: 50 INJECTION INTRAMUSCULAR; INTRAVENOUS at 11:06

## 2024-10-09 RX ADMIN — FENTANYL CITRATE 50 MCG: 50 INJECTION INTRAMUSCULAR; INTRAVENOUS at 11:03

## 2024-10-09 RX ADMIN — Medication 20 MG: at 11:09

## 2024-10-09 RX ADMIN — PROPOFOL 150 MCG/KG/MIN: 10 INJECTION, EMULSION INTRAVENOUS at 11:07

## 2024-10-09 ASSESSMENT — PAIN - FUNCTIONAL ASSESSMENT
PAIN_FUNCTIONAL_ASSESSMENT: 0-10
PAIN_FUNCTIONAL_ASSESSMENT: NONE - DENIES PAIN
PAIN_FUNCTIONAL_ASSESSMENT: ACTIVITIES ARE NOT PREVENTED
PAIN_FUNCTIONAL_ASSESSMENT: 0-10
PAIN_FUNCTIONAL_ASSESSMENT: NONE - DENIES PAIN

## 2024-10-09 ASSESSMENT — PAIN DESCRIPTION - DESCRIPTORS: DESCRIPTORS: DISCOMFORT

## 2024-10-09 ASSESSMENT — LIFESTYLE VARIABLES: SMOKING_STATUS: 0

## 2024-10-09 ASSESSMENT — ENCOUNTER SYMPTOMS: SHORTNESS OF BREATH: 0

## 2024-10-09 NOTE — DISCHARGE INSTRUCTIONS
Follow up in 2-3 weeks  Call [phone] for an appointment  Remove dressings in 3-4 days  Ok to shower in AM  No Driving for today. OK to drive at that time if you are not taking any pain medication.

## 2024-10-09 NOTE — PROGRESS NOTES
Pt ambulated to restroom by self, gait steady. taken to car via wheelchair, being driven home by wife. No signs of distress noted at time of discharge to car.

## 2024-10-09 NOTE — PROGRESS NOTES
Received from PACU. Admitted to Phase 2 care.  Awake and alert, respirations easy and even.  Oriented to room and surroundings.  No complaints.

## 2024-10-09 NOTE — ANESTHESIA PRE PROCEDURE
Department of Anesthesiology  Preprocedure Note       Name:  Ritesh Hagen   Age:  59 y.o.  :  1965                                          MRN:  [id number]         Date:  10/9/2024      Surgeon: Surgeon(s):  Elías Teran MD    Procedure: Procedure(s):  REMOVAL OF MULTIPLE LIPOMAS OF BACK AND TORSO  MULTIPLE LIPOMA REMOVAL OF RIGHT AND LEFT ARMS    Medications prior to admission:   Prior to Admission medications    Medication Sig Start Date End Date Taking? Authorizing Provider   omeprazole (PRILOSEC) 40 MG delayed release capsule TAKE 1 CAPSULE BY MOUTH EVERY DAY 30 MINUTES BEFORE MORNING MEAL FOR 90 DAYS 24  Yes Za Neville MD   cetirizine (ZYRTEC) 10 MG tablet Take 1 tablet by mouth daily   Yes ProviderZa MD       Current medications:    Current Facility-Administered Medications   Medication Dose Route Frequency Provider Last Rate Last Admin    sodium chloride flush 0.9 % injection 5-40 mL  5-40 mL IntraVENous 2 times per day Michele Cain MD        sodium chloride flush 0.9 % injection 5-40 mL  5-40 mL IntraVENous PRN Michele Cain MD        0.9 % sodium chloride infusion   IntraVENous PRN Michele Cain MD        ceFAZolin (ANCEF) 2,000 mg in sodium chloride 0.9 % 50 mL IVPB (mini-bag)  2,000 mg IntraVENous Once Elías Teran MD         Facility-Administered Medications Ordered in Other Encounters   Medication Dose Route Frequency Provider Last Rate Last Admin    iopamidol (ISOVUE-370) 76 % injection 100 mL  100 mL IntraVENous ONCE PRN Ashok Tejeda MD           Allergies:    Allergies   Allergen Reactions    Penicillin G Hives    Penicillins Hives    Unable To Assess      \"some type of anesthesia meds make me very nauseated.\"       Problem List:    Patient Active Problem List   Diagnosis Code    Esophageal stricture K22.2    GERD (gastroesophageal reflux disease) K21.9    Lumbar spinal stenosis M48.061    Palpitations R00.2    Chronic urticaria L50.8

## 2024-10-09 NOTE — PROGRESS NOTES
Tolerating oral intake.  States some right thigh pain.  Denies need for pain medication.  Discharge instructions given to patient and wife.  Verbalize understanding.

## 2024-10-09 NOTE — ANESTHESIA POSTPROCEDURE EVALUATION
Department of Anesthesiology  Postprocedure Note    Patient: Ritesh Hagen  MRN: [id number]  YOB: 1965  Date of evaluation: 10/9/2024    Procedure Summary       Date: 10/09/24 Room / Location: [address]    Anesthesia Start: 1059 Anesthesia Stop: 1221    Procedures:       REMOVAL OF MULTIPLE LIPOMAS OF BACK AND TORSO      MULTIPLE LIPOMA REMOVAL OF RIGHT AND LEFT ARMS, RIGHT THIGH (Bilateral) Diagnosis:       Lipoma      (Lipoma [D17.9])    Surgeons: Elías Teran MD Responsible Provider: Gregg Stratton MD    Anesthesia Type: MAC ASA Status: 2            Anesthesia Type: No value filed.    Courtney Phase I: Courtney Score: 9    Courtney Phase II: Courtney Score: 10    Anesthesia Post Evaluation    Patient location during evaluation: PACU  Level of consciousness: awake and alert  Airway patency: patent  Nausea & Vomiting: no nausea and no vomiting  Cardiovascular status: blood pressure returned to baseline  Respiratory status: acceptable  Hydration status: euvolemic  Comments: Postoperative Anesthesia Note    Name:    Ritesh Hagen  MRN:      [id number]    Patient Vitals in the past 12 hrs:  10/09/24 1311, BP:(!) 143/68, Temp:97 °F (36.1 °C), Temp src:Temporal, Pulse:(!) 49, Resp:18, SpO2:99 %  10/09/24 1241, BP:135/75, Temp:97 °F (36.1 °C), Temp src:Temporal, Pulse:(!) 44, Resp:16, SpO2:98 %  10/09/24 1237, Temp:97 °F (36.1 °C), Pulse:(!) 40, Resp:15, SpO2:99 %  10/09/24 1235, BP:(!) 140/86, Pulse:(!) 46, Resp:(!) 7, SpO2:98 %  10/09/24 1230, BP:136/85, Pulse:(!) 38, Resp:12, SpO2:100 %  10/09/24 1225, BP:121/84, Pulse:(!) 45, Resp:21, SpO2:100 %  10/09/24 1220, BP:116/81, Pulse:(!) 49, Resp:12, SpO2:100 %  10/09/24 1217, BP:124/74, Temp:98 °F (36.7 °C), Temp src:Temporal, Pulse:54, Resp:13, SpO2:99 %  10/09/24 1002, BP:129/84, Temp:98 °F (36.7 °C), Temp src:Oral, Pulse:52, Resp:16, SpO2:99 %, Height:1.905 m (6' 3\"), Weight:95.4 kg (210 lb 3.3 oz)

## 2024-10-09 NOTE — BRIEF OP NOTE
Brief Postoperative Note      Patient: Ritesh Hagen  YOB: 1965  MRN: [id number]    Date of Procedure: 10/9/2024    Pre-Op Diagnosis Codes:      * Lipoma [D17.9]    Post-Op Diagnosis: Same       Procedure(s):  REMOVAL OF MULTIPLE LIPOMAS OF BACK AND TORSO  MULTIPLE LIPOMA REMOVAL OF RIGHT AND LEFT ARMS, RIGHT THIGH    All subcutaneous   Back x 2, 1 cm each   Chest wall x 3, 1 cm each   Right forearm x 2, 1 cm, 1.5 cm   Left forearm x 4, less than 1 cm each   Right thigh x 1, 1.5 cm    Surgeon(s):  Ralph Andrade MD    Assistant:  Surgical Assistant: Holly Patel Casey R    Anesthesia: Monitor Anesthesia Care    Estimated Blood Loss (mL): less than 50     Complications: None    Specimens:   ID Type Source Tests Collected by Time Destination   A : A. Multiple lipomas Tissue Tissue SURGICAL PATHOLOGY Ralph Andrade MD 10/9/2024 1126        Implants:  * No implants in log *      Drains: * No LDAs found *    Findings:  Infection Present At Time Of Surgery (PATOS) (choose all levels that have infection present):  No infection present  Other Findings: no complications  This procedure was not performed to treat primary cutaneous melanoma through wide local excision    Electronically signed by RALPH ANDRADE MD on 10/9/2024 at 12:12 PM

## 2024-10-09 NOTE — PROGRESS NOTES
Care assumed from gasper desai. Pt dressing, assisted into chair at bedside, putting on shoes, states is feeling better but would like a few more moments before leaving. Aware to open door when ready to go. Discharge previously discussed with patient and family by lloyd jackman.

## 2024-10-09 NOTE — H&P
PATIENT NAME: Ritesh Hagen   YOB: 1965    ADMISSION DATE: 10/9/2024  9:29 AM      TODAY'S DATE: 10/9/2024    CHIEF COMPLAINT:  lipomas      HISTORY OF PRESENT ILLNESS:  The patient is a 59 y.o. male  who presents with multiple lipomas. For removal today    Past Medical History:        Diagnosis Date    Acid reflux disease     Hard of hearing     right ear hearing loss       Past Surgical History:        Procedure Laterality Date    BACK SURGERY      COLONOSCOPY  09/2023    Dr. Davey Ashley    COLONOSCOPY N/A 9/21/2023    COLONOSCOPY POLYPECTOMY SNARE/COLD BIOPSY performed by Davey Ashley MD at [facility]    CYST REMOVAL      lipomas    HERNIA REPAIR      inguinal    INNER EAR SURGERY      right ear cholesteotoma x2 - last time age 29    LUMBAR FUSION  07/22/2015    L5-S1    SHOULDER SURGERY      left - screw for dislocations    TONSILLECTOMY  age 5    and adenoids    UPPER GASTROINTESTINAL ENDOSCOPY  09/17/2018    UPPER GASTROINTESTINAL ENDOSCOPY N/A 9/21/2023    EGD BIOPSY performed by Davey Ashley MD at [facility]    VASECTOMY      around age 42       Medications Prior to Admission:   Medications Prior to Admission: omeprazole (PRILOSEC) 40 MG delayed release capsule, TAKE 1 CAPSULE BY MOUTH EVERY DAY 30 MINUTES BEFORE MORNING MEAL FOR 90 DAYS  cetirizine (ZYRTEC) 10 MG tablet, Take 1 tablet by mouth daily    Allergies:  Penicillin g, Penicillins, and Unable to assess    Social History:   TOBACCO:   reports that he has never smoked. He has never used smokeless tobacco.  ETOH:   reports current alcohol use.  DRUGS:   reports no history of drug use.      Family History:   Reviewed and non contributory to the current clinical condition    REVIEW OF SYSTEMS:    CONSTITUTIONAL:  negative  HEENT:  negative  CARDIOVASCULAR:  negative  GASTROINTESTINAL:  negative  GENITOURINARY:  negative  HEMATOLOGIC/LYMPHATIC:  negative  ENDOCRINE:  negative  All other systems

## 2024-10-09 NOTE — PROGRESS NOTES
Pt arrived to PACU from OR. Pt asleep on room air, awakens easily. Dressings x 8 C/D/I. Pt denies c/o pain at this time.

## 2024-10-10 NOTE — OP NOTE
[facility]          [address] [place] [address]                            OPERATIVE REPORT      PATIENT NAME: LOU LUX                : 1965  MED REC NO: [id number]                      ROOM: OR  ACCOUNT NO: [id number]                       ADMIT DATE: 10/09/2024  PROVIDER: Elías Teran MD      DATE OF PROCEDURE:  10/09/2024    SURGEON:  Elías Teran MD    PREOPERATIVE DIAGNOSIS:  Multiple lipomas.    POSTOPERATIVE DIAGNOSIS:  Multiple lipomas.    PROCEDURE:  Removal of subcutaneous lipomas;  1. Removal of back lipoma x2, 1 cm each.  2. Removal of chest-wall lipoma x3, 1 cm each.  3. Removal of right forearm lipoma x2, 1 cm and 1.5 cm.  4. Removal of left forearm lipoma x4, less than 1 cm each.  5. Removal of lipoma, right thigh x1, 1.5 cm.    SPECIMEN:  Lipomas.    ESTIMATED BLOOD LOSS:  Less than 50 mL.    COMPLICATIONS:  None.    DISPOSITION:  To Recovery in stable condition.    INDICATION:  The patient is a 59-year-old male who has had several lipomas removed over several years.  He presents with lipomas on both arms, his right thigh, chest, and back.  He states that these are each causing pain with direct pressure and seemed to be increasing in size.  The risks, benefits, and alternatives were reviewed, and he agreed to proceed today.    DESCRIPTION OF PROCEDURE:  The patient was brought to the operating room, placed lateral with the right side up and sedation delivered.  His back was prepped and draped in a sterile fashion.  In the lower back, 1 to the right and 1 to the left of midline, 2 lipomas were removed.  Local anesthetic was infused.  Transverse incision made and dissection carried into the subcutaneous space where obvious lipomas were encountered.  They each measured 1 cm and were removed.  Those incisions were closed with 3-0 and 4-0 Vicryl.  On his right chest wall near the flank, a 3rd lipoma was removed in

## 2024-12-06 ENCOUNTER — OFFICE VISIT (OUTPATIENT)
Dept: FAMILY MEDICINE CLINIC | Age: 59
End: 2024-12-06
Payer: COMMERCIAL

## 2024-12-06 VITALS
BODY MASS INDEX: 26.11 KG/M2 | SYSTOLIC BLOOD PRESSURE: 118 MMHG | HEIGHT: 75 IN | WEIGHT: 210 LBS | HEART RATE: 59 BPM | OXYGEN SATURATION: 99 % | DIASTOLIC BLOOD PRESSURE: 70 MMHG

## 2024-12-06 DIAGNOSIS — Z01.818 PRE-OP EXAM: Primary | ICD-10-CM

## 2024-12-06 LAB
ANION GAP SERPL CALCULATED.3IONS-SCNC: 10 MMOL/L (ref 3–16)
BUN SERPL-MCNC: 24 MG/DL (ref 7–20)
CALCIUM SERPL-MCNC: 9.6 MG/DL (ref 8.3–10.6)
CHLORIDE SERPL-SCNC: 101 MMOL/L (ref 99–110)
CO2 SERPL-SCNC: 26 MMOL/L (ref 21–32)
CREAT SERPL-MCNC: 1.1 MG/DL (ref 0.8–1.3)
GFR SERPLBLD CREATININE-BSD FMLA CKD-EPI: 77 ML/MIN/{1.73_M2}
GLUCOSE SERPL-MCNC: 85 MG/DL (ref 70–99)
POTASSIUM SERPL-SCNC: 4.5 MMOL/L (ref 3.5–5.1)
SODIUM SERPL-SCNC: 137 MMOL/L (ref 136–145)

## 2024-12-06 PROCEDURE — 99214 OFFICE O/P EST MOD 30 MIN: CPT | Performed by: REGISTERED NURSE

## 2024-12-06 PROCEDURE — G8484 FLU IMMUNIZE NO ADMIN: HCPCS | Performed by: REGISTERED NURSE

## 2024-12-06 PROCEDURE — 36415 COLL VENOUS BLD VENIPUNCTURE: CPT | Performed by: REGISTERED NURSE

## 2024-12-06 PROCEDURE — 3017F COLORECTAL CA SCREEN DOC REV: CPT | Performed by: REGISTERED NURSE

## 2024-12-06 PROCEDURE — 93000 ELECTROCARDIOGRAM COMPLETE: CPT | Performed by: REGISTERED NURSE

## 2024-12-06 PROCEDURE — 1036F TOBACCO NON-USER: CPT | Performed by: REGISTERED NURSE

## 2024-12-06 PROCEDURE — G8419 CALC BMI OUT NRM PARAM NOF/U: HCPCS | Performed by: REGISTERED NURSE

## 2024-12-06 PROCEDURE — G8427 DOCREV CUR MEDS BY ELIG CLIN: HCPCS | Performed by: REGISTERED NURSE

## 2024-12-06 NOTE — PROGRESS NOTES
Last seen 5/30/2023  Next appt 12/4/2023 Preoperative Consultation      Ritesh Hagen  YOB: 1965    Date of Service:  12/6/2024    Vitals:    12/06/24 1135   BP: 118/70   Site: Right Upper Arm   Position: Sitting   Cuff Size: Large Adult   Pulse: 59   SpO2: 99%   Weight: 95.3 kg (210 lb)   Height: 1.905 m (6' 3\")      Wt Readings from Last 2 Encounters:   12/06/24 95.3 kg (210 lb)   10/09/24 95.4 kg (210 lb 3.3 oz)     BP Readings from Last 3 Encounters:   12/06/24 118/70   10/09/24 (!) 143/68   10/07/24 120/79        Chief Complaint   Patient presents with    Pre-op Exam     PRE OP EXAM BONE SPUR REMOVAL 12/27  [facility]   FLU VACCINE?     Allergies   Allergen Reactions    Penicillin G Hives    Penicillins Hives    Unable To Assess      \"some type of anesthesia meds make me very nauseated.\"     Outpatient Medications Marked as Taking for the 12/6/24 encounter (Office Visit) with Jaquelin Montoya APRN - CNP   Medication Sig Dispense Refill    omeprazole (PRILOSEC) 40 MG delayed release capsule TAKE 1 CAPSULE BY MOUTH EVERY DAY 30 MINUTES BEFORE MORNING MEAL FOR 90 DAYS      cetirizine (ZYRTEC) 10 MG tablet Take 1 tablet by mouth daily         This patient presents to the office today for a preoperative consultation at the request of surgeon, Dr. Mendez Will, who plans on performing right paulo spur excision, right repair of chronic achilles tendon tear on December 27 at [place].  The current problem began 1 years ago, and symptoms have been worsening with time.  Conservative therapy: Yes: stretching, medications, activity cessation, which has been not very effective..    Planned anesthesia: General   Known anesthesia problems: PONV   Bleeding risk: No recent or remote history of abnormal bleeding  Personal or FH of DVT/PE: No    Patient objection to receiving blood products: No    Patient Active Problem List   Diagnosis    Esophageal stricture    GERD (gastroesophageal reflux disease)    Lumbar spinal stenosis

## 2025-07-05 ASSESSMENT — PATIENT HEALTH QUESTIONNAIRE - PHQ9
1. LITTLE INTEREST OR PLEASURE IN DOING THINGS: NOT AT ALL
SUM OF ALL RESPONSES TO PHQ QUESTIONS 1-9: 0
SUM OF ALL RESPONSES TO PHQ QUESTIONS 1-9: 0
2. FEELING DOWN, DEPRESSED OR HOPELESS: NOT AT ALL
1. LITTLE INTEREST OR PLEASURE IN DOING THINGS: NOT AT ALL
2. FEELING DOWN, DEPRESSED OR HOPELESS: NOT AT ALL
SUM OF ALL RESPONSES TO PHQ QUESTIONS 1-9: 0
SUM OF ALL RESPONSES TO PHQ9 QUESTIONS 1 & 2: 0
SUM OF ALL RESPONSES TO PHQ QUESTIONS 1-9: 0

## 2025-07-08 ENCOUNTER — OFFICE VISIT (OUTPATIENT)
Dept: FAMILY MEDICINE CLINIC | Age: 60
End: 2025-07-08
Payer: COMMERCIAL

## 2025-07-08 VITALS
SYSTOLIC BLOOD PRESSURE: 110 MMHG | OXYGEN SATURATION: 98 % | HEIGHT: 75 IN | DIASTOLIC BLOOD PRESSURE: 70 MMHG | WEIGHT: 210 LBS | HEART RATE: 55 BPM | BODY MASS INDEX: 26.11 KG/M2

## 2025-07-08 DIAGNOSIS — Z80.42 FAMILY HISTORY OF PROSTATE CANCER IN FATHER: ICD-10-CM

## 2025-07-08 DIAGNOSIS — E78.00 HYPERCHOLESTEREMIA: ICD-10-CM

## 2025-07-08 DIAGNOSIS — K21.9 GASTROESOPHAGEAL REFLUX DISEASE, UNSPECIFIED WHETHER ESOPHAGITIS PRESENT: ICD-10-CM

## 2025-07-08 DIAGNOSIS — Z00.00 ENCOUNTER FOR WELLNESS EXAMINATION IN ADULT: Primary | ICD-10-CM

## 2025-07-08 DIAGNOSIS — K22.2 ESOPHAGEAL STRICTURE: ICD-10-CM

## 2025-07-08 LAB
ALBUMIN SERPL-MCNC: 4.3 G/DL (ref 3.4–5)
ALBUMIN/GLOB SERPL: 1.8 {RATIO} (ref 1.1–2.2)
ALP SERPL-CCNC: 38 U/L (ref 40–129)
ALT SERPL-CCNC: 47 U/L (ref 10–40)
ANION GAP SERPL CALCULATED.3IONS-SCNC: 11 MMOL/L (ref 3–16)
AST SERPL-CCNC: 45 U/L (ref 15–37)
BASOPHILS # BLD: 0 K/UL (ref 0–0.2)
BASOPHILS NFR BLD: 0.5 %
BILIRUB SERPL-MCNC: 0.5 MG/DL (ref 0–1)
BUN SERPL-MCNC: 21 MG/DL (ref 7–20)
CALCIUM SERPL-MCNC: 9.6 MG/DL (ref 8.3–10.6)
CHLORIDE SERPL-SCNC: 105 MMOL/L (ref 99–110)
CHOLEST SERPL-MCNC: 222 MG/DL (ref 0–199)
CO2 SERPL-SCNC: 25 MMOL/L (ref 21–32)
CREAT SERPL-MCNC: 1.1 MG/DL (ref 0.8–1.3)
DEPRECATED RDW RBC AUTO: 14.3 % (ref 12.4–15.4)
EOSINOPHIL # BLD: 0.3 K/UL (ref 0–0.6)
EOSINOPHIL NFR BLD: 5.5 %
GFR SERPLBLD CREATININE-BSD FMLA CKD-EPI: 76 ML/MIN/{1.73_M2}
GLUCOSE SERPL-MCNC: 97 MG/DL (ref 70–99)
HCT VFR BLD AUTO: 40.4 % (ref 40.5–52.5)
HDLC SERPL-MCNC: 47 MG/DL (ref 40–60)
HGB BLD-MCNC: 13.6 G/DL (ref 13.5–17.5)
LDL CHOLESTEROL: 155 MG/DL
LYMPHOCYTES # BLD: 1.5 K/UL (ref 1–5.1)
LYMPHOCYTES NFR BLD: 29.3 %
MCH RBC QN AUTO: 31.6 PG (ref 26–34)
MCHC RBC AUTO-ENTMCNC: 33.7 G/DL (ref 31–36)
MCV RBC AUTO: 93.9 FL (ref 80–100)
MONOCYTES # BLD: 0.5 K/UL (ref 0–1.3)
MONOCYTES NFR BLD: 10.5 %
NEUTROPHILS # BLD: 2.8 K/UL (ref 1.7–7.7)
NEUTROPHILS NFR BLD: 54.2 %
PLATELET # BLD AUTO: 175 K/UL (ref 135–450)
PMV BLD AUTO: 10.1 FL (ref 5–10.5)
POTASSIUM SERPL-SCNC: 4.4 MMOL/L (ref 3.5–5.1)
PROT SERPL-MCNC: 6.7 G/DL (ref 6.4–8.2)
RBC # BLD AUTO: 4.3 M/UL (ref 4.2–5.9)
SODIUM SERPL-SCNC: 141 MMOL/L (ref 136–145)
TRIGL SERPL-MCNC: 101 MG/DL (ref 0–150)
VIT B12 SERPL-MCNC: 442 PG/ML (ref 211–911)
VLDLC SERPL CALC-MCNC: 20 MG/DL
WBC # BLD AUTO: 5.1 K/UL (ref 4–11)

## 2025-07-08 PROCEDURE — 99396 PREV VISIT EST AGE 40-64: CPT | Performed by: REGISTERED NURSE

## 2025-07-08 PROCEDURE — 36415 COLL VENOUS BLD VENIPUNCTURE: CPT | Performed by: REGISTERED NURSE

## 2025-07-08 RX ORDER — CELECOXIB 200 MG/1
200 CAPSULE ORAL DAILY
COMMUNITY
Start: 2025-06-24

## 2025-07-08 SDOH — ECONOMIC STABILITY: FOOD INSECURITY: WITHIN THE PAST 12 MONTHS, YOU WORRIED THAT YOUR FOOD WOULD RUN OUT BEFORE YOU GOT MONEY TO BUY MORE.: NEVER TRUE

## 2025-07-08 SDOH — ECONOMIC STABILITY: FOOD INSECURITY: WITHIN THE PAST 12 MONTHS, THE FOOD YOU BOUGHT JUST DIDN'T LAST AND YOU DIDN'T HAVE MONEY TO GET MORE.: NEVER TRUE

## 2025-07-08 ASSESSMENT — ENCOUNTER SYMPTOMS
ABDOMINAL PAIN: 0
WHEEZING: 0
SHORTNESS OF BREATH: 0
DIARRHEA: 0
NAUSEA: 0
VOMITING: 0
COUGH: 0
BACK PAIN: 0
CONSTIPATION: 0
CHEST TIGHTNESS: 0

## 2025-07-08 NOTE — PROGRESS NOTES
Assessment & Plan  Annual physical examination  - Blood pressure readings are within the normal range at 110/70, heart rate is slightly lower than average, which appears to be a chronic condition, HR has been noted to be lower at previous visits.   - ASCVD risk score from last year's lipid profile r/w pt, discussed possibility of starting statin therapy.   - Fish oil supplement prescribed once daily to manage cholesterol levels, statin therapy may be initiated if ASCVD risk score exceeds 10 percent.    Hypercholesterolemia  - Last year's ASCVD risk r/w pt. Discussed possibility of starting statin therapy based off today's lipid values. At this time, pt would like to hold off on starting statin therapy unless necessary. Otherwise, pt would like to start taking a fish oil supplement instead.  - based off discussion with pt, we will see what his lipid levels are today, start fish oil supplement, then re-check lipid levels in 6 months, and then likely order CT cardiac calcium scoring.     Gastroesophageal reflux disorder/Esophageal Stricture  - Currently taking omeprazole for acid reflux.  - Potential long-term effects of omeprazole, discussed.  - History of endoscopic procedures supports continued use of omeprazole.  - Benefits outweigh the risks in his case, advised to continue omeprazole.    Health maintenance  - Up to date on colonoscopy, last performed in 2023, next scheduled for 2026.  - Importance of shingles vaccine discussed, advised to consider in the future.   - Pneumonia vaccine discussed, pt declines at this time, no significant health conditions.       Subjective   History of Present Illness  The patient is a 59-year-old male who is here for his annual physical. He has a past medical history of acid reflux,  joint surgeries, most recently, his R foot.    He reports no need for medication refills at this time. He underwent Achilles tendon surgery in late December 2024, which was successful. However, he

## 2025-07-10 LAB
PROSTATE SPECIFIC ANTIGEN: 0.52 NG/ML (ref 0–4)
PSA FREE MFR SERPL: 76.9 %
PSA FREE SERPL-MCNC: 0.4 UG/L

## (undated) DEVICE — BANDAGE,GAUZE,BULKEE II,4.5"X4.1YD,STRL: Brand: MEDLINE

## (undated) DEVICE — ELECTRODE PT RET AD L9FT HI MOIST COND ADH HYDRGEL CORDED

## (undated) DEVICE — DRAPE,MINOR PROC,6X6 FEN, STER: Brand: MEDLINE

## (undated) DEVICE — SOLUTION IRRIG 1000ML 0.9% SOD CHL USP POUR PLAS BTL

## (undated) DEVICE — T-DRAPE,EXTREMITY,STERILE: Brand: MEDLINE

## (undated) DEVICE — GLOVE ORANGE PI 7   MSG9070

## (undated) DEVICE — GAUZE,SPONGE,2"X2",8PLY,STERILE,LF,2'S: Brand: MEDLINE

## (undated) DEVICE — SNARES COLD OVAL 10MM THIN

## (undated) DEVICE — MINOR SET UP: Brand: MEDLINE INDUSTRIES, INC.

## (undated) DEVICE — MERCY HEALTH WEST TURNOVER: Brand: MEDLINE INDUSTRIES, INC.

## (undated) DEVICE — 3M™ TEGADERM™ TRANSPARENT FILM DRESSING FRAME STYLE, 1626W, 4 IN X 4-3/4 IN (10 CM X 12 CM), 50/CT 4CT/CASE: Brand: 3M™ TEGADERM™

## (undated) DEVICE — CLEANER,CAUTERY TIP,2X2",STERILE: Brand: MEDLINE

## (undated) DEVICE — MASTISOL ADHESIVE LIQ 2/3ML

## (undated) DEVICE — SPONGE GZ W4XL4IN COT 12 PLY TYP VII WVN C FLD DSGN STERILE

## (undated) DEVICE — STRIP,CLOSURE,WOUND,MEDI-STRIP,1/2X4: Brand: MEDLINE

## (undated) DEVICE — DRAPE,REIN 53X77,STERILE: Brand: MEDLINE

## (undated) DEVICE — TRANSFER SET 3": Brand: MEDLINE INDUSTRIES, INC.

## (undated) DEVICE — FORCEPS BX 240CM 2.4MM L NDL RAD JAW 4 M00513334

## (undated) DEVICE — 3M™ TEGADERM™ TRANSPARENT FILM DRESSING FRAME STYLE, 1624W, 2-3/8 IN X 2-3/4 IN (6 CM X 7 CM), 100/CT 4CT/CASE: Brand: 3M™ TEGADERM™